# Patient Record
Sex: FEMALE | Race: WHITE | NOT HISPANIC OR LATINO | URBAN - METROPOLITAN AREA
[De-identification: names, ages, dates, MRNs, and addresses within clinical notes are randomized per-mention and may not be internally consistent; named-entity substitution may affect disease eponyms.]

---

## 2019-01-04 ENCOUNTER — OUTPATIENT (OUTPATIENT)
Dept: OUTPATIENT SERVICES | Facility: HOSPITAL | Age: 74
LOS: 1 days | Discharge: HOME | End: 2019-01-04

## 2019-01-04 DIAGNOSIS — I44.7 LEFT BUNDLE-BRANCH BLOCK, UNSPECIFIED: ICD-10-CM

## 2019-01-04 DIAGNOSIS — R93.1 ABNORMAL FINDINGS ON DIAGNOSTIC IMAGING OF HEART AND CORONARY CIRCULATION: ICD-10-CM

## 2019-05-04 PROBLEM — Z00.00 ENCOUNTER FOR PREVENTIVE HEALTH EXAMINATION: Status: ACTIVE | Noted: 2019-05-04

## 2019-06-03 ENCOUNTER — APPOINTMENT (OUTPATIENT)
Dept: CARDIOLOGY | Facility: CLINIC | Age: 74
End: 2019-06-03
Payer: MEDICARE

## 2019-06-03 PROCEDURE — 93000 ELECTROCARDIOGRAM COMPLETE: CPT

## 2019-06-03 PROCEDURE — 99214 OFFICE O/P EST MOD 30 MIN: CPT

## 2019-12-03 ENCOUNTER — APPOINTMENT (OUTPATIENT)
Dept: CARDIOLOGY | Facility: CLINIC | Age: 74
End: 2019-12-03
Payer: MEDICARE

## 2019-12-03 PROCEDURE — 93306 TTE W/DOPPLER COMPLETE: CPT

## 2019-12-10 ENCOUNTER — APPOINTMENT (OUTPATIENT)
Dept: CARDIOLOGY | Facility: CLINIC | Age: 74
End: 2019-12-10
Payer: MEDICARE

## 2019-12-10 PROCEDURE — 93880 EXTRACRANIAL BILAT STUDY: CPT

## 2020-01-13 ENCOUNTER — APPOINTMENT (OUTPATIENT)
Dept: CARDIOLOGY | Facility: CLINIC | Age: 75
End: 2020-01-13
Payer: MEDICARE

## 2020-01-13 PROCEDURE — 93000 ELECTROCARDIOGRAM COMPLETE: CPT

## 2020-01-13 PROCEDURE — 99214 OFFICE O/P EST MOD 30 MIN: CPT

## 2020-02-04 ENCOUNTER — APPOINTMENT (OUTPATIENT)
Dept: VASCULAR SURGERY | Facility: CLINIC | Age: 75
End: 2020-02-04
Payer: MEDICARE

## 2020-02-04 VITALS
SYSTOLIC BLOOD PRESSURE: 136 MMHG | BODY MASS INDEX: 23.99 KG/M2 | HEIGHT: 59 IN | DIASTOLIC BLOOD PRESSURE: 84 MMHG | WEIGHT: 119 LBS

## 2020-02-04 DIAGNOSIS — Z85.3 PERSONAL HISTORY OF MALIGNANT NEOPLASM OF BREAST: ICD-10-CM

## 2020-02-04 DIAGNOSIS — E03.9 HYPOTHYROIDISM, UNSPECIFIED: ICD-10-CM

## 2020-02-04 DIAGNOSIS — Z86.73 PERSONAL HISTORY OF TRANSIENT ISCHEMIC ATTACK (TIA), AND CEREBRAL INFARCTION W/OUT RESIDUAL DEFICITS: ICD-10-CM

## 2020-02-04 DIAGNOSIS — Z82.49 FAMILY HISTORY OF ISCHEMIC HEART DISEASE AND OTHER DISEASES OF THE CIRCULATORY SYSTEM: ICD-10-CM

## 2020-02-04 DIAGNOSIS — F17.200 NICOTINE DEPENDENCE, UNSPECIFIED, UNCOMPLICATED: ICD-10-CM

## 2020-02-04 DIAGNOSIS — Z85.850 PERSONAL HISTORY OF MALIGNANT NEOPLASM OF THYROID: ICD-10-CM

## 2020-02-04 DIAGNOSIS — E78.00 PURE HYPERCHOLESTEROLEMIA, UNSPECIFIED: ICD-10-CM

## 2020-02-04 PROCEDURE — 99203 OFFICE O/P NEW LOW 30 MIN: CPT

## 2020-02-04 PROCEDURE — 93880 EXTRACRANIAL BILAT STUDY: CPT

## 2020-02-04 NOTE — CONSULT LETTER
[Dear  ___] : Dear  [unfilled], [Please see my note below.] : Please see my note below. [Consult Letter:] : I had the pleasure of evaluating your patient, [unfilled]. [FreeTextEntry2] : Dear Dr. Raymond Louie,

## 2020-02-04 NOTE — ASSESSMENT
[FreeTextEntry1] : 75 y/o female with h/o smoking, TIA in 2001 (symptoms of facial droop), sent in by cardiology for left ICA stenosis found on duplex few months ago. She also c/o pain to bilateral legs on ambulation at half block to one block.\par I performed a carotid duplex which was medically necessary to evaluate for high grade carotid stenosis. It showed 50-69% bilateral ICA stenosis with PSV > 200 cm/s bilaterally.\par She has moderate bilateral ICA stenosis on duplex and I would like to get a CTA of the neck for more accurate assessment of the degree of stenosis. I will see her back after the CTA to discuss results.

## 2020-02-04 NOTE — HISTORY OF PRESENT ILLNESS
[FreeTextEntry1] : 73 y/o female with h/o smoking, TIA in 2001 (symptoms of facial droop), sent in by cardiology for left ICA stenosis found on duplex few months ago. She also c/o pain to bilateral legs on ambulation at half block to one block.

## 2020-02-15 ENCOUNTER — FORM ENCOUNTER (OUTPATIENT)
Age: 75
End: 2020-02-15

## 2020-02-16 ENCOUNTER — OUTPATIENT (OUTPATIENT)
Dept: OUTPATIENT SERVICES | Facility: HOSPITAL | Age: 75
LOS: 1 days | Discharge: HOME | End: 2020-02-16
Payer: MEDICARE

## 2020-02-16 DIAGNOSIS — I65.23 OCCLUSION AND STENOSIS OF BILATERAL CAROTID ARTERIES: ICD-10-CM

## 2020-02-16 PROCEDURE — 70498 CT ANGIOGRAPHY NECK: CPT | Mod: 26

## 2020-03-03 ENCOUNTER — APPOINTMENT (OUTPATIENT)
Dept: VASCULAR SURGERY | Facility: CLINIC | Age: 75
End: 2020-03-03
Payer: MEDICARE

## 2020-03-03 PROCEDURE — 99213 OFFICE O/P EST LOW 20 MIN: CPT

## 2020-03-03 NOTE — HISTORY OF PRESENT ILLNESS
[FreeTextEntry1] : 76 y/o female with h/o smoking, TIA in 2001 (symptoms of facial droop), sent in by cardiology for left ICA stenosis found on duplex few months ago. She had a CTA of the neck that showed high-grade bilateral carotid artery stenosis, is right hand dominant.

## 2020-03-03 NOTE — CONSULT LETTER
[Dear  ___] : Dear  [unfilled], [Please see my note below.] : Please see my note below. [Courtesy Letter:] : I had the pleasure of seeing your patient, [unfilled], in my office today. [FreeTextEntry2] : Dear Dr. Raymond Louie,

## 2020-03-03 NOTE — ASSESSMENT
[FreeTextEntry1] : 76 y/o female with h/o smoking, TIA in 2001 (symptoms of facial droop), sent in by cardiology for left ICA stenosis found on duplex few months ago. She had a CTA of the neck that showed high-grade bilateral carotid artery stenosis, is right hand dominant.\par I have offered her left carotid endarterectomy for stroke prevention. I have discussed the surgical procedure with her including the risks and benefits and she wants to proceed. I have instructed her to start taking Aspirin 81 mg daily and continue on it throughout the period of surgery.\par She will require medical and cardiac clearance prior to surgery. She is scheduled for left CEA on 3/19/2020.

## 2020-03-10 ENCOUNTER — FORM ENCOUNTER (OUTPATIENT)
Age: 75
End: 2020-03-10

## 2020-03-11 ENCOUNTER — OUTPATIENT (OUTPATIENT)
Dept: OUTPATIENT SERVICES | Facility: HOSPITAL | Age: 75
LOS: 1 days | Discharge: HOME | End: 2020-03-11
Payer: MEDICARE

## 2020-03-11 VITALS
RESPIRATION RATE: 15 BRPM | DIASTOLIC BLOOD PRESSURE: 74 MMHG | OXYGEN SATURATION: 98 % | WEIGHT: 134.92 LBS | HEIGHT: 58 IN | HEART RATE: 76 BPM | TEMPERATURE: 98 F | SYSTOLIC BLOOD PRESSURE: 157 MMHG

## 2020-03-11 DIAGNOSIS — Z01.818 ENCOUNTER FOR OTHER PREPROCEDURAL EXAMINATION: ICD-10-CM

## 2020-03-11 DIAGNOSIS — I65.23 OCCLUSION AND STENOSIS OF BILATERAL CAROTID ARTERIES: ICD-10-CM

## 2020-03-11 DIAGNOSIS — Z90.710 ACQUIRED ABSENCE OF BOTH CERVIX AND UTERUS: Chronic | ICD-10-CM

## 2020-03-11 DIAGNOSIS — E89.0 POSTPROCEDURAL HYPOTHYROIDISM: Chronic | ICD-10-CM

## 2020-03-11 LAB
ALBUMIN SERPL ELPH-MCNC: 4.3 G/DL — SIGNIFICANT CHANGE UP (ref 3.5–5.2)
ALP SERPL-CCNC: 70 U/L — SIGNIFICANT CHANGE UP (ref 30–115)
ALT FLD-CCNC: 9 U/L — SIGNIFICANT CHANGE UP (ref 0–41)
ANION GAP SERPL CALC-SCNC: 10 MMOL/L — SIGNIFICANT CHANGE UP (ref 7–14)
APTT BLD: 37.6 SEC — SIGNIFICANT CHANGE UP (ref 27–39.2)
AST SERPL-CCNC: 15 U/L — SIGNIFICANT CHANGE UP (ref 0–41)
BASOPHILS # BLD AUTO: 0.09 K/UL — SIGNIFICANT CHANGE UP (ref 0–0.2)
BASOPHILS NFR BLD AUTO: 1.1 % — HIGH (ref 0–1)
BILIRUB SERPL-MCNC: 0.4 MG/DL — SIGNIFICANT CHANGE UP (ref 0.2–1.2)
BUN SERPL-MCNC: 13 MG/DL — SIGNIFICANT CHANGE UP (ref 10–20)
CALCIUM SERPL-MCNC: 9.4 MG/DL — SIGNIFICANT CHANGE UP (ref 8.5–10.1)
CHLORIDE SERPL-SCNC: 106 MMOL/L — SIGNIFICANT CHANGE UP (ref 98–110)
CO2 SERPL-SCNC: 26 MMOL/L — SIGNIFICANT CHANGE UP (ref 17–32)
CREAT SERPL-MCNC: 0.7 MG/DL — SIGNIFICANT CHANGE UP (ref 0.7–1.5)
EOSINOPHIL # BLD AUTO: 0.21 K/UL — SIGNIFICANT CHANGE UP (ref 0–0.7)
EOSINOPHIL NFR BLD AUTO: 2.5 % — SIGNIFICANT CHANGE UP (ref 0–8)
GLUCOSE SERPL-MCNC: 89 MG/DL — SIGNIFICANT CHANGE UP (ref 70–99)
HCT VFR BLD CALC: 43.8 % — SIGNIFICANT CHANGE UP (ref 37–47)
HGB BLD-MCNC: 14.1 G/DL — SIGNIFICANT CHANGE UP (ref 12–16)
IMM GRANULOCYTES NFR BLD AUTO: 0.4 % — HIGH (ref 0.1–0.3)
INR BLD: 1.08 RATIO — SIGNIFICANT CHANGE UP (ref 0.65–1.3)
LYMPHOCYTES # BLD AUTO: 2.07 K/UL — SIGNIFICANT CHANGE UP (ref 1.2–3.4)
LYMPHOCYTES # BLD AUTO: 24.2 % — SIGNIFICANT CHANGE UP (ref 20.5–51.1)
MCHC RBC-ENTMCNC: 29.3 PG — SIGNIFICANT CHANGE UP (ref 27–31)
MCHC RBC-ENTMCNC: 32.2 G/DL — SIGNIFICANT CHANGE UP (ref 32–37)
MCV RBC AUTO: 91.1 FL — SIGNIFICANT CHANGE UP (ref 81–99)
MONOCYTES # BLD AUTO: 0.54 K/UL — SIGNIFICANT CHANGE UP (ref 0.1–0.6)
MONOCYTES NFR BLD AUTO: 6.3 % — SIGNIFICANT CHANGE UP (ref 1.7–9.3)
NEUTROPHILS # BLD AUTO: 5.61 K/UL — SIGNIFICANT CHANGE UP (ref 1.4–6.5)
NEUTROPHILS NFR BLD AUTO: 65.5 % — SIGNIFICANT CHANGE UP (ref 42.2–75.2)
NRBC # BLD: 0 /100 WBCS — SIGNIFICANT CHANGE UP (ref 0–0)
PLATELET # BLD AUTO: 263 K/UL — SIGNIFICANT CHANGE UP (ref 130–400)
POTASSIUM SERPL-MCNC: 5.1 MMOL/L — HIGH (ref 3.5–5)
POTASSIUM SERPL-SCNC: 5.1 MMOL/L — HIGH (ref 3.5–5)
PROT SERPL-MCNC: 6.9 G/DL — SIGNIFICANT CHANGE UP (ref 6–8)
PROTHROM AB SERPL-ACNC: 12.4 SEC — SIGNIFICANT CHANGE UP (ref 9.95–12.87)
RBC # BLD: 4.81 M/UL — SIGNIFICANT CHANGE UP (ref 4.2–5.4)
RBC # FLD: 13.2 % — SIGNIFICANT CHANGE UP (ref 11.5–14.5)
SODIUM SERPL-SCNC: 142 MMOL/L — SIGNIFICANT CHANGE UP (ref 135–146)
WBC # BLD: 8.55 K/UL — SIGNIFICANT CHANGE UP (ref 4.8–10.8)
WBC # FLD AUTO: 8.55 K/UL — SIGNIFICANT CHANGE UP (ref 4.8–10.8)

## 2020-03-11 PROCEDURE — 93010 ELECTROCARDIOGRAM REPORT: CPT

## 2020-03-11 PROCEDURE — 71046 X-RAY EXAM CHEST 2 VIEWS: CPT | Mod: 26

## 2020-03-11 NOTE — H&P PST ADULT - RS GEN PE MLT RESP DETAILS PC
airway patent/no chest wall tenderness/breath sounds equal/good air movement/respirations non-labored/clear to auscultation bilaterally/normal

## 2020-03-11 NOTE — H&P PST ADULT - NSICDXPASTMEDICALHX_GEN_ALL_CORE_FT
PAST MEDICAL HISTORY:  Breast cancer LEFT RADICALMASTECTECTOMY    HTN (hypertension)     Hypercholesterolemia     Hypothyroidism     Stroke TIA

## 2020-03-11 NOTE — H&P PST ADULT - REASON FOR ADMISSION
PT PRESENTS TO PAST WITH NO SOB, CP, PALPITATIONS, DYSURIA, UTI OR URI AT PRESENT.   PT ABLE TO WALK UP 1/2   FLIGHTS OF STEPS WITH NO SOB- VERY SLOWLY.   AS PER THE PT, THIS IS HIS/HER COMPLETE MEDICAL AND SURGICAL HX, INCLUDING MEDICATIONS PRESCRIBED AND OVER THE COUNTER  PT PRESENTS TO PAST WITH H/O-  SOB WITH EXERTION.  PT SCHEDULED FOR LEFT CAROTID ENDARTERECTOMY WITH PATCH.

## 2020-03-31 PROBLEM — E78.00 PURE HYPERCHOLESTEROLEMIA, UNSPECIFIED: Chronic | Status: ACTIVE | Noted: 2020-03-11

## 2020-03-31 PROBLEM — C50.919 MALIGNANT NEOPLASM OF UNSPECIFIED SITE OF UNSPECIFIED FEMALE BREAST: Chronic | Status: ACTIVE | Noted: 2020-03-11

## 2020-03-31 PROBLEM — E03.9 HYPOTHYROIDISM, UNSPECIFIED: Chronic | Status: ACTIVE | Noted: 2020-03-11

## 2020-03-31 PROBLEM — I10 ESSENTIAL (PRIMARY) HYPERTENSION: Chronic | Status: ACTIVE | Noted: 2020-03-11

## 2020-05-27 ENCOUNTER — OUTPATIENT (OUTPATIENT)
Dept: OUTPATIENT SERVICES | Facility: HOSPITAL | Age: 75
LOS: 1 days | Discharge: HOME | End: 2020-05-27

## 2020-05-27 VITALS
HEIGHT: 59 IN | DIASTOLIC BLOOD PRESSURE: 63 MMHG | RESPIRATION RATE: 16 BRPM | OXYGEN SATURATION: 96 % | HEART RATE: 74 BPM | SYSTOLIC BLOOD PRESSURE: 148 MMHG | WEIGHT: 128.97 LBS | TEMPERATURE: 98 F

## 2020-05-27 DIAGNOSIS — Z90.710 ACQUIRED ABSENCE OF BOTH CERVIX AND UTERUS: Chronic | ICD-10-CM

## 2020-05-27 DIAGNOSIS — Z01.818 ENCOUNTER FOR OTHER PREPROCEDURAL EXAMINATION: ICD-10-CM

## 2020-05-27 DIAGNOSIS — I65.23 OCCLUSION AND STENOSIS OF BILATERAL CAROTID ARTERIES: ICD-10-CM

## 2020-05-27 DIAGNOSIS — E89.0 POSTPROCEDURAL HYPOTHYROIDISM: Chronic | ICD-10-CM

## 2020-05-27 LAB
ALBUMIN SERPL ELPH-MCNC: 4.2 G/DL — SIGNIFICANT CHANGE UP (ref 3.5–5.2)
ALP SERPL-CCNC: 68 U/L — SIGNIFICANT CHANGE UP (ref 30–115)
ALT FLD-CCNC: 12 U/L — SIGNIFICANT CHANGE UP (ref 0–41)
ANION GAP SERPL CALC-SCNC: 14 MMOL/L — SIGNIFICANT CHANGE UP (ref 7–14)
APTT BLD: 33 SEC — SIGNIFICANT CHANGE UP (ref 27–39.2)
AST SERPL-CCNC: 18 U/L — SIGNIFICANT CHANGE UP (ref 0–41)
BASOPHILS # BLD AUTO: 0.08 K/UL — SIGNIFICANT CHANGE UP (ref 0–0.2)
BASOPHILS NFR BLD AUTO: 0.9 % — SIGNIFICANT CHANGE UP (ref 0–1)
BILIRUB SERPL-MCNC: <0.2 MG/DL — SIGNIFICANT CHANGE UP (ref 0.2–1.2)
BLD GP AB SCN SERPL QL: SIGNIFICANT CHANGE UP
BUN SERPL-MCNC: 16 MG/DL — SIGNIFICANT CHANGE UP (ref 10–20)
CALCIUM SERPL-MCNC: 8.8 MG/DL — SIGNIFICANT CHANGE UP (ref 8.5–10.1)
CHLORIDE SERPL-SCNC: 108 MMOL/L — SIGNIFICANT CHANGE UP (ref 98–110)
CO2 SERPL-SCNC: 23 MMOL/L — SIGNIFICANT CHANGE UP (ref 17–32)
CREAT SERPL-MCNC: 0.8 MG/DL — SIGNIFICANT CHANGE UP (ref 0.7–1.5)
EOSINOPHIL # BLD AUTO: 0.18 K/UL — SIGNIFICANT CHANGE UP (ref 0–0.7)
EOSINOPHIL NFR BLD AUTO: 1.9 % — SIGNIFICANT CHANGE UP (ref 0–8)
GLUCOSE SERPL-MCNC: 102 MG/DL — HIGH (ref 70–99)
HCT VFR BLD CALC: 40.4 % — SIGNIFICANT CHANGE UP (ref 37–47)
HGB BLD-MCNC: 13.1 G/DL — SIGNIFICANT CHANGE UP (ref 12–16)
IMM GRANULOCYTES NFR BLD AUTO: 0.3 % — SIGNIFICANT CHANGE UP (ref 0.1–0.3)
INR BLD: 0.97 RATIO — SIGNIFICANT CHANGE UP (ref 0.65–1.3)
LYMPHOCYTES # BLD AUTO: 2.38 K/UL — SIGNIFICANT CHANGE UP (ref 1.2–3.4)
LYMPHOCYTES # BLD AUTO: 25.3 % — SIGNIFICANT CHANGE UP (ref 20.5–51.1)
MCHC RBC-ENTMCNC: 29.6 PG — SIGNIFICANT CHANGE UP (ref 27–31)
MCHC RBC-ENTMCNC: 32.4 G/DL — SIGNIFICANT CHANGE UP (ref 32–37)
MCV RBC AUTO: 91.2 FL — SIGNIFICANT CHANGE UP (ref 81–99)
MONOCYTES # BLD AUTO: 0.63 K/UL — HIGH (ref 0.1–0.6)
MONOCYTES NFR BLD AUTO: 6.7 % — SIGNIFICANT CHANGE UP (ref 1.7–9.3)
NEUTROPHILS # BLD AUTO: 6.09 K/UL — SIGNIFICANT CHANGE UP (ref 1.4–6.5)
NEUTROPHILS NFR BLD AUTO: 64.9 % — SIGNIFICANT CHANGE UP (ref 42.2–75.2)
NRBC # BLD: 0 /100 WBCS — SIGNIFICANT CHANGE UP (ref 0–0)
PLATELET # BLD AUTO: 239 K/UL — SIGNIFICANT CHANGE UP (ref 130–400)
POTASSIUM SERPL-MCNC: 4.5 MMOL/L — SIGNIFICANT CHANGE UP (ref 3.5–5)
POTASSIUM SERPL-SCNC: 4.5 MMOL/L — SIGNIFICANT CHANGE UP (ref 3.5–5)
PROT SERPL-MCNC: 6.6 G/DL — SIGNIFICANT CHANGE UP (ref 6–8)
PROTHROM AB SERPL-ACNC: 11.2 SEC — SIGNIFICANT CHANGE UP (ref 9.95–12.87)
RBC # BLD: 4.43 M/UL — SIGNIFICANT CHANGE UP (ref 4.2–5.4)
RBC # FLD: 13.4 % — SIGNIFICANT CHANGE UP (ref 11.5–14.5)
SODIUM SERPL-SCNC: 145 MMOL/L — SIGNIFICANT CHANGE UP (ref 135–146)
WBC # BLD: 9.39 K/UL — SIGNIFICANT CHANGE UP (ref 4.8–10.8)
WBC # FLD AUTO: 9.39 K/UL — SIGNIFICANT CHANGE UP (ref 4.8–10.8)

## 2020-05-27 RX ORDER — SIMVASTATIN 20 MG/1
1 TABLET, FILM COATED ORAL
Qty: 0 | Refills: 0 | DISCHARGE

## 2020-05-27 RX ORDER — ROSUVASTATIN CALCIUM 5 MG/1
1 TABLET ORAL
Qty: 0 | Refills: 0 | DISCHARGE

## 2020-05-27 NOTE — H&P PST ADULT - HISTORY OF PRESENT ILLNESS
PT PRESENTS TO PAST WITH NO SOB, CP, PALPITATIONS, DYSURIA, UTI OR URI AT PRESENT.   PT ABLE TO WALK UP 1/2   FLIGHTS OF STEPS WITH NO SOB- VERY SLOWLY.   AS PER THE PT, THIS IS HIS/HER COMPLETE MEDICAL AND SURGICAL HX, INCLUDING MEDICATIONS PRESCRIBED AND OVER THE COUNTER  PT PRESENTS TO PAST WITH H/O-  SOB WITH EXERTION.  DENIES EXPOSURE TO COVID POSITIVE PERSON.  DENIES TRAVELING OUTSIDE [place]/[facility] IN THE PAST 30 DAYS          .

## 2020-05-27 NOTE — H&P PST ADULT - REASON FOR ADMISSION
PT SCHEDULED FOR LEFT CAROTID ENDARTERECTOMY WITH PATCH ON 6/3/2020 AT [facility]

## 2020-05-27 NOTE — H&P PST ADULT - RS GEN PE MLT RESP DETAILS PC
clear to auscultation bilaterally/normal/no chest wall tenderness/airway patent/respirations non-labored/breath sounds equal/good air movement

## 2020-06-03 ENCOUNTER — INPATIENT (INPATIENT)
Facility: HOSPITAL | Age: 75
LOS: 0 days | Discharge: HOME | End: 2020-06-04
Attending: SURGERY | Admitting: SURGERY
Payer: MEDICARE

## 2020-06-03 ENCOUNTER — RESULT REVIEW (OUTPATIENT)
Age: 75
End: 2020-06-03

## 2020-06-03 ENCOUNTER — APPOINTMENT (OUTPATIENT)
Dept: VASCULAR SURGERY | Facility: HOSPITAL | Age: 75
End: 2020-06-03

## 2020-06-03 VITALS
DIASTOLIC BLOOD PRESSURE: 66 MMHG | RESPIRATION RATE: 18 BRPM | HEIGHT: 59 IN | OXYGEN SATURATION: 96 % | SYSTOLIC BLOOD PRESSURE: 170 MMHG | TEMPERATURE: 98 F | WEIGHT: 117.07 LBS | HEART RATE: 69 BPM

## 2020-06-03 DIAGNOSIS — E89.0 POSTPROCEDURAL HYPOTHYROIDISM: Chronic | ICD-10-CM

## 2020-06-03 DIAGNOSIS — Z90.710 ACQUIRED ABSENCE OF BOTH CERVIX AND UTERUS: Chronic | ICD-10-CM

## 2020-06-03 LAB
ANION GAP SERPL CALC-SCNC: 11 MMOL/L — SIGNIFICANT CHANGE UP (ref 7–14)
ANION GAP SERPL CALC-SCNC: 12 MMOL/L — SIGNIFICANT CHANGE UP (ref 7–14)
APTT BLD: 136.6 SEC — CRITICAL HIGH (ref 27–39.2)
APTT BLD: 30.3 SEC — SIGNIFICANT CHANGE UP (ref 27–39.2)
BASOPHILS # BLD AUTO: 0.07 K/UL — SIGNIFICANT CHANGE UP (ref 0–0.2)
BASOPHILS NFR BLD AUTO: 0.7 % — SIGNIFICANT CHANGE UP (ref 0–1)
BUN SERPL-MCNC: 13 MG/DL — SIGNIFICANT CHANGE UP (ref 10–20)
BUN SERPL-MCNC: 14 MG/DL — SIGNIFICANT CHANGE UP (ref 10–20)
CALCIUM SERPL-MCNC: 8.2 MG/DL — LOW (ref 8.5–10.1)
CALCIUM SERPL-MCNC: 8.5 MG/DL — SIGNIFICANT CHANGE UP (ref 8.5–10.1)
CHLORIDE SERPL-SCNC: 107 MMOL/L — SIGNIFICANT CHANGE UP (ref 98–110)
CHLORIDE SERPL-SCNC: 108 MMOL/L — SIGNIFICANT CHANGE UP (ref 98–110)
CK MB CFR SERPL CALC: 1.7 NG/ML — SIGNIFICANT CHANGE UP (ref 0.6–6.3)
CK MB CFR SERPL CALC: 2.6 NG/ML — SIGNIFICANT CHANGE UP (ref 0.6–6.3)
CK SERPL-CCNC: 37 U/L — SIGNIFICANT CHANGE UP (ref 0–225)
CK SERPL-CCNC: 75 U/L — SIGNIFICANT CHANGE UP (ref 0–225)
CO2 SERPL-SCNC: 20 MMOL/L — SIGNIFICANT CHANGE UP (ref 17–32)
CO2 SERPL-SCNC: 23 MMOL/L — SIGNIFICANT CHANGE UP (ref 17–32)
CREAT SERPL-MCNC: 0.7 MG/DL — SIGNIFICANT CHANGE UP (ref 0.7–1.5)
CREAT SERPL-MCNC: 0.8 MG/DL — SIGNIFICANT CHANGE UP (ref 0.7–1.5)
EOSINOPHIL # BLD AUTO: 0.09 K/UL — SIGNIFICANT CHANGE UP (ref 0–0.7)
EOSINOPHIL NFR BLD AUTO: 0.8 % — SIGNIFICANT CHANGE UP (ref 0–8)
GLUCOSE SERPL-MCNC: 135 MG/DL — HIGH (ref 70–99)
GLUCOSE SERPL-MCNC: 136 MG/DL — HIGH (ref 70–99)
HCT VFR BLD CALC: 35.3 % — LOW (ref 37–47)
HGB BLD-MCNC: 11.7 G/DL — LOW (ref 12–16)
IMM GRANULOCYTES NFR BLD AUTO: 0.6 % — HIGH (ref 0.1–0.3)
INR BLD: 1.07 RATIO — SIGNIFICANT CHANGE UP (ref 0.65–1.3)
INR BLD: 1.16 RATIO — SIGNIFICANT CHANGE UP (ref 0.65–1.3)
LYMPHOCYTES # BLD AUTO: 1.27 K/UL — SIGNIFICANT CHANGE UP (ref 1.2–3.4)
LYMPHOCYTES # BLD AUTO: 11.9 % — LOW (ref 20.5–51.1)
MAGNESIUM SERPL-MCNC: 1.8 MG/DL — SIGNIFICANT CHANGE UP (ref 1.8–2.4)
MAGNESIUM SERPL-MCNC: 1.9 MG/DL — SIGNIFICANT CHANGE UP (ref 1.8–2.4)
MCHC RBC-ENTMCNC: 29.8 PG — SIGNIFICANT CHANGE UP (ref 27–31)
MCHC RBC-ENTMCNC: 33.1 G/DL — SIGNIFICANT CHANGE UP (ref 32–37)
MCV RBC AUTO: 90.1 FL — SIGNIFICANT CHANGE UP (ref 81–99)
MONOCYTES # BLD AUTO: 0.24 K/UL — SIGNIFICANT CHANGE UP (ref 0.1–0.6)
MONOCYTES NFR BLD AUTO: 2.2 % — SIGNIFICANT CHANGE UP (ref 1.7–9.3)
NEUTROPHILS # BLD AUTO: 8.97 K/UL — HIGH (ref 1.4–6.5)
NEUTROPHILS NFR BLD AUTO: 83.8 % — HIGH (ref 42.2–75.2)
NRBC # BLD: 0 /100 WBCS — SIGNIFICANT CHANGE UP (ref 0–0)
PHOSPHATE SERPL-MCNC: 2.5 MG/DL — SIGNIFICANT CHANGE UP (ref 2.1–4.9)
PHOSPHATE SERPL-MCNC: 4.4 MG/DL — SIGNIFICANT CHANGE UP (ref 2.1–4.9)
PLATELET # BLD AUTO: 196 K/UL — SIGNIFICANT CHANGE UP (ref 130–400)
POTASSIUM SERPL-MCNC: 4.2 MMOL/L — SIGNIFICANT CHANGE UP (ref 3.5–5)
POTASSIUM SERPL-MCNC: 4.2 MMOL/L — SIGNIFICANT CHANGE UP (ref 3.5–5)
POTASSIUM SERPL-SCNC: 4.2 MMOL/L — SIGNIFICANT CHANGE UP (ref 3.5–5)
POTASSIUM SERPL-SCNC: 4.2 MMOL/L — SIGNIFICANT CHANGE UP (ref 3.5–5)
PROTHROM AB SERPL-ACNC: 12.3 SEC — SIGNIFICANT CHANGE UP (ref 9.95–12.87)
PROTHROM AB SERPL-ACNC: 13.3 SEC — HIGH (ref 9.95–12.87)
RBC # BLD: 3.92 M/UL — LOW (ref 4.2–5.4)
RBC # FLD: 13.7 % — SIGNIFICANT CHANGE UP (ref 11.5–14.5)
SODIUM SERPL-SCNC: 139 MMOL/L — SIGNIFICANT CHANGE UP (ref 135–146)
SODIUM SERPL-SCNC: 142 MMOL/L — SIGNIFICANT CHANGE UP (ref 135–146)
TROPONIN T SERPL-MCNC: <0.01 NG/ML — SIGNIFICANT CHANGE UP
TROPONIN T SERPL-MCNC: <0.01 NG/ML — SIGNIFICANT CHANGE UP
WBC # BLD: 10.7 K/UL — SIGNIFICANT CHANGE UP (ref 4.8–10.8)
WBC # FLD AUTO: 10.7 K/UL — SIGNIFICANT CHANGE UP (ref 4.8–10.8)

## 2020-06-03 PROCEDURE — 35301 RECHANNELING OF ARTERY: CPT | Mod: LT

## 2020-06-03 PROCEDURE — 71045 X-RAY EXAM CHEST 1 VIEW: CPT | Mod: 26

## 2020-06-03 PROCEDURE — 88304 TISSUE EXAM BY PATHOLOGIST: CPT | Mod: 26

## 2020-06-03 PROCEDURE — 93010 ELECTROCARDIOGRAM REPORT: CPT

## 2020-06-03 PROCEDURE — 88311 DECALCIFY TISSUE: CPT | Mod: 26

## 2020-06-03 PROCEDURE — 99291 CRITICAL CARE FIRST HOUR: CPT

## 2020-06-03 RX ORDER — HEPARIN SODIUM 5000 [USP'U]/ML
5000 INJECTION INTRAVENOUS; SUBCUTANEOUS EVERY 8 HOURS
Refills: 0 | Status: DISCONTINUED | OUTPATIENT
Start: 2020-06-04 | End: 2020-06-04

## 2020-06-03 RX ORDER — ATORVASTATIN CALCIUM 80 MG/1
80 TABLET, FILM COATED ORAL AT BEDTIME
Refills: 0 | Status: DISCONTINUED | OUTPATIENT
Start: 2020-06-04 | End: 2020-06-04

## 2020-06-03 RX ORDER — LISINOPRIL 2.5 MG/1
20 TABLET ORAL DAILY
Refills: 0 | Status: DISCONTINUED | OUTPATIENT
Start: 2020-06-03 | End: 2020-06-04

## 2020-06-03 RX ORDER — ACETAMINOPHEN 500 MG
650 TABLET ORAL EVERY 6 HOURS
Refills: 0 | Status: DISCONTINUED | OUTPATIENT
Start: 2020-06-03 | End: 2020-06-04

## 2020-06-03 RX ORDER — SODIUM CHLORIDE 9 MG/ML
1000 INJECTION, SOLUTION INTRAVENOUS
Refills: 0 | Status: DISCONTINUED | OUTPATIENT
Start: 2020-06-03 | End: 2020-06-04

## 2020-06-03 RX ORDER — SODIUM CHLORIDE 9 MG/ML
1000 INJECTION, SOLUTION INTRAVENOUS
Refills: 0 | Status: DISCONTINUED | OUTPATIENT
Start: 2020-06-03 | End: 2020-06-03

## 2020-06-03 RX ORDER — CHLORHEXIDINE GLUCONATE 213 G/1000ML
1 SOLUTION TOPICAL
Refills: 0 | Status: DISCONTINUED | OUTPATIENT
Start: 2020-06-03 | End: 2020-06-04

## 2020-06-03 RX ORDER — MORPHINE SULFATE 50 MG/1
2 CAPSULE, EXTENDED RELEASE ORAL
Refills: 0 | Status: DISCONTINUED | OUTPATIENT
Start: 2020-06-03 | End: 2020-06-03

## 2020-06-03 RX ORDER — PANTOPRAZOLE SODIUM 20 MG/1
40 TABLET, DELAYED RELEASE ORAL
Refills: 0 | Status: DISCONTINUED | OUTPATIENT
Start: 2020-06-03 | End: 2020-06-04

## 2020-06-03 RX ORDER — FOLIC ACID 0.8 MG
1 TABLET ORAL DAILY
Refills: 0 | Status: DISCONTINUED | OUTPATIENT
Start: 2020-06-03 | End: 2020-06-04

## 2020-06-03 RX ORDER — MAGNESIUM SULFATE 500 MG/ML
1 VIAL (ML) INJECTION ONCE
Refills: 0 | Status: COMPLETED | OUTPATIENT
Start: 2020-06-03 | End: 2020-06-04

## 2020-06-03 RX ORDER — ACETAMINOPHEN 500 MG
650 TABLET ORAL ONCE
Refills: 0 | Status: COMPLETED | OUTPATIENT
Start: 2020-06-03 | End: 2020-06-03

## 2020-06-03 RX ORDER — LEVOTHYROXINE SODIUM 125 MCG
75 TABLET ORAL DAILY
Refills: 0 | Status: DISCONTINUED | OUTPATIENT
Start: 2020-06-03 | End: 2020-06-04

## 2020-06-03 RX ORDER — ASPIRIN/CALCIUM CARB/MAGNESIUM 324 MG
81 TABLET ORAL DAILY
Refills: 0 | Status: DISCONTINUED | OUTPATIENT
Start: 2020-06-04 | End: 2020-06-04

## 2020-06-03 RX ORDER — ATORVASTATIN CALCIUM 80 MG/1
80 TABLET, FILM COATED ORAL AT BEDTIME
Refills: 0 | Status: DISCONTINUED | OUTPATIENT
Start: 2020-06-03 | End: 2020-06-03

## 2020-06-03 RX ORDER — OXYCODONE HYDROCHLORIDE 5 MG/1
5 TABLET ORAL EVERY 4 HOURS
Refills: 0 | Status: DISCONTINUED | OUTPATIENT
Start: 2020-06-03 | End: 2020-06-04

## 2020-06-03 RX ORDER — MORPHINE SULFATE 50 MG/1
4 CAPSULE, EXTENDED RELEASE ORAL
Refills: 0 | Status: DISCONTINUED | OUTPATIENT
Start: 2020-06-03 | End: 2020-06-03

## 2020-06-03 RX ORDER — ONDANSETRON 8 MG/1
4 TABLET, FILM COATED ORAL ONCE
Refills: 0 | Status: DISCONTINUED | OUTPATIENT
Start: 2020-06-03 | End: 2020-06-03

## 2020-06-03 RX ADMIN — MORPHINE SULFATE 2 MILLIGRAM(S): 50 CAPSULE, EXTENDED RELEASE ORAL at 14:50

## 2020-06-03 RX ADMIN — Medication 1.25 MILLIGRAM(S): at 20:35

## 2020-06-03 RX ADMIN — MORPHINE SULFATE 2 MILLIGRAM(S): 50 CAPSULE, EXTENDED RELEASE ORAL at 15:02

## 2020-06-03 RX ADMIN — Medication 650 MILLIGRAM(S): at 23:57

## 2020-06-03 RX ADMIN — Medication 650 MILLIGRAM(S): at 20:34

## 2020-06-03 RX ADMIN — LISINOPRIL 20 MILLIGRAM(S): 2.5 TABLET ORAL at 20:16

## 2020-06-03 RX ADMIN — MORPHINE SULFATE 4 MILLIGRAM(S): 50 CAPSULE, EXTENDED RELEASE ORAL at 14:35

## 2020-06-03 RX ADMIN — Medication 650 MILLIGRAM(S): at 18:54

## 2020-06-03 NOTE — CONSULT NOTE ADULT - ASSESSMENT
Assessment & Plan    75y Female  s/p     NEURO:    Acute pain-controlled with     RESP:     Oxygen insufficiency-wean off NC to RA as tolerate    Activity-      CARDS:       Imaging:     Labs:     GI/NUTR:     Diet, Clear Liquid      GI Prophylaxis-    Bowel regimen-      pantoprazole    Tablet 40 milliGRAM(s) Oral before breakfast      /RENAL:       Strict I/O-    BUN/Cr-           HEME/ONC:       DVT prophylaxis-    Acute anemia-H/H     Pulse Exam-      ID:          ENDO:      Glucose     HA1C     LINES/DRAINS:  Barby DAVIS Foley     DISPO:  SICU, d/w Dr. Man Assessment & Plan    75F s/p left carotid endarterectomy.     NEURO:    Acute pain-controlled with     RESP:     Oxygen insufficiency-wean off NC to RA as tolerate    Activity-      CARDS:       Imaging:     Labs:     GI/NUTR:     Diet, Clear Liquid      GI Prophylaxis-    Bowel regimen-    pantoprazole    Tablet 40 milliGRAM(s) Oral before breakfast    /RENAL:       Strict I/O-    BUN/Cr-     HEME/ONC:       DVT prophylaxis-    Acute anemia-H/H     Pulse Exam-    ID:          ENDO:      Glucose     HA1C     LINES/DRAINS:  Barby DAVIS Foley    DISPO:  SICU, d/w Dr. Man Assessment & Plan  75F s/p left carotid endarterectomy.     NEURO:  - Acute pain-controlled with APAP 650mg Q6H, morphine 2 PRN    RESP:   - Oxygen insufficiency-wean off NC to RA as tolerated        CARDS:   - PMH of HTN      GI/NUTR:     Diet, Clear Liquid      GI Prophylaxis-    Bowel regimen-    pantoprazole    Tablet 40 milliGRAM(s) Oral before breakfast    /RENAL:       Strict I/O-    BUN/Cr-     HEME/ONC:       DVT prophylaxis-    Acute anemia-H/H     Pulse Exam-    ID:  - no active issues  - received 2g Ancef pre-operatively    ENDO:  - PMH of     Glucose     HA1C     LINES/DRAINS:  RYAN, Ambreen Dior  ACTIVITY: bedrest for 24 hours  DISPO:  SICU, d/w Dr. Man Assessment & Plan  75F w/ PMH of HLD, breast cancer s/p left radial mastectomy, hypothyroidism, HTN, thyroid cancer s/p total thyroidectomy, and s/p partial hysterectomy who presents POD 0 s/p left carotid endarterectomy.     NEURO:  - Pain: APAP 650mg Q6H, morphine 2 PRN  - Q1H neuro checks  - no neuro deficits    RESP:   - wean off NC to RA as tolerated  - AM CXR  - active smoker      CARDS:   - PMH of HTN: on home lisinopril  - SBP goal 130-150  - if persistently hypertensive, consider PRN pushes of vasotec  - PMH of HLD: home med rosuvastatin, starting atorvastatin tomorrow 6/4  - starting aspirin 81mg tomorrow 6/4  - trend CE: 0.01 >     GI/NUTR:  - diet: CLD, regular in AM  - GI PPX: PTX    /RENAL:   - FU TOV  - BUN/Cr 14/0.8    HEME/ONC:   - DVT PPX: HSQ to start 6/4  - H/H: 11.7 / 35.3  - received 6500 heparin intraoperatively, .6    ID:  - no active issues  - received 2g Ancef pre-operatively    ENDO:  - PMH of thyroid cancer s/p thyroidectomy  - on home levothyroxine    LINES/DRAINS:  PIV, R radial Barby, L arm precautions  ACTIVITY: bedrest for 24 hours  DISPO: SICU, d/w Dr. Man

## 2020-06-03 NOTE — CONSULT NOTE ADULT - SUBJECTIVE AND OBJECTIVE BOX
SICU Consultation Note  ======================================================================================================  JAZMIN ESPARZA  [id number]      75y Female        Consult:      Pertinent Imaging:  Echo-  CT-      Procedure:  OR Stats  OR Time:               EBL:          IV Fluids:       Blood Products:                UOP:      Findings-    PMH  PAST MEDICAL & SURGICAL HISTORY:  Hypercholesterolemia  Stroke: TIA  Breast cancer: LEFT RADICALMASTECTECTOMY  Hypothyroidism  HTN (hypertension)  H/O total thyroidectomy  H/O: hysterectomy: PARTIAL      Home Meds:  Home Medications:  aspirin 81 mg oral tablet: 1 tab(s) orally once a day (03 Jun 2020 09:17)  Crestor 20 mg oral tablet: 1 tab(s) orally once a day (03 Jun 2020 09:17)  folic acid 1 mg oral tablet: 1 tab(s) orally once a day (03 Jun 2020 09:17)  lisinopril 20 mg oral tablet: 1 tab(s) orally once a day (03 Jun 2020 09:17)  Synthroid 75 mcg (0.075 mg) oral tablet: 1 tab(s) orally once a day (03 Jun 2020 09:17)  Zoloft:  (03 Jun 2020 09:17)         Allergies  Allergies    No Known Allergies    Intolerances         Current Medications:  acetaminophen   Tablet .. 650 milliGRAM(s) Oral every 6 hours  atorvastatin 80 milliGRAM(s) Oral at bedtime  chlorhexidine 4% Liquid 1 Application(s) Topical <User Schedule>  folic acid 1 milliGRAM(s) Oral daily  lactated ringers. 1000 milliLiter(s) (75 mL/Hr) IV Continuous <Continuous>  lactated ringers. 1000 milliLiter(s) (70 mL/Hr) IV Continuous <Continuous>  levothyroxine 75 MICROGram(s) Oral daily  lisinopril 20 milliGRAM(s) Oral daily  morphine  - Injectable 2 milliGRAM(s) IV Push every 10 minutes PRN Moderate Pain (4 - 6)  ondansetron Injectable 4 milliGRAM(s) IV Push once PRN Nausea and/or Vomiting  pantoprazole    Tablet 40 milliGRAM(s) Oral before breakfast      Advanced Directives: Presumed Full Code    VITAL SIGNS, INS/OUTS (Last 24hours):    ICU Vital Signs Last 24 Hrs  T(C): 36.7 (03 Jun 2020 09:00), Max: 36.7 (03 Jun 2020 09:00)  T(F): 98.1 (03 Jun 2020 09:00), Max: 98.1 (03 Jun 2020 09:00)  HR: 69 (03 Jun 2020 09:00) (69 - 69)  BP: 170/66 (03 Jun 2020 09:00) (170/66 - 170/66)  BP(mean): --  ABP: --  ABP(mean): --  RR: 18 (03 Jun 2020 09:00) (18 - 18)  SpO2: 96% (03 Jun 2020 09:00) (96% - 96%)    I&O's Summary      Height (cm): 149.86 (06-03-20)  Weight (kg): 53.1 (06-03-20)  BMI (kg/m2): 23.6 (06-03-20)  BSA (m2): 1.47 (06-03-20)    Physical Exam:   ---------------------------------------------------------------------------------------  GCS:      Exam: A&Ox3, no focal deficits    RESPIRATORY:  Normal expansion/effort  Mechanical Ventilation:     CARDIOVASCULAR:   S1/S2.  RRR  No peripheral edema    GASTROINTESTINAL:  Abdomen soft, non-tender, non-distended    MUSCULOSKELETAL:  Extremities warm, pink, well-perfused.    DERM:  No skin breakdown     :   Exam: Crook catheter in place.       Tubes/Lines/Drains   ----------------------------------------------------------------------------------------------------------  [x] Peripheral IV  [X] Central Venous Line          IJ/Femoral             Date Placed:    [X] Arterial Line		      Radial/Femoral    Date Placed:   [X] PICC:         	  [X] Midline		                                  Date Placed:   [X] Urinary Catheter Crook                                             Date Placed:       LABS  --------------------------------------------------------------------------------------  LFTs      Cardiac Markers        Coagulation      Arterial Blood Gas      Blood Sugar  CAPILLARY BLOOD GLUCOSE          Urinalysis      Cultures          CT/XRAY/ECHO/TCD/EEG  ----------------------------------------------------------------------------------------------            --------------------------------------------------------------------------------------  Admit Diagnosis: I65.23, 08679 SICU Consultation Note  ======================================================================================================  JAZMIN ESPARZA  [id number]    75F w/ PMH of HLD, breast cancer s/p left radial mastectomy, hypothyroidism, HTN, thyroid cancer s/p total thyroidectomy, s/p partial hysterectomy who presents for scheduled left carotid endarterectomy for     Pertinent Imaging:  Echo-  CT-    Procedure:   OR Stats  OR Time: 2 hours       EBL: 50cc         IV Fluids:       Blood Products: 0          UOP: 0     Findings- left carotid plaque    PMH  PAST MEDICAL & SURGICAL HISTORY:  Hypercholesterolemia  Stroke: TIA  Breast cancer: LEFT RADICAL MASTECTECTOMY  Hypothyroidism  HTN (hypertension)  H/O total thyroidectomy  H/O: hysterectomy: PARTIAL    Home Meds:  Home Medications:  aspirin 81 mg oral tablet: 1 tab(s) orally once a day (03 Jun 2020 09:17)  Crestor 20 mg oral tablet: 1 tab(s) orally once a day (03 Jun 2020 09:17)  folic acid 1 mg oral tablet: 1 tab(s) orally once a day (03 Jun 2020 09:17)  lisinopril 20 mg oral tablet: 1 tab(s) orally once a day (03 Jun 2020 09:17)  Synthroid 75 mcg (0.075 mg) oral tablet: 1 tab(s) orally once a day (03 Jun 2020 09:17)  Zoloft:  (03 Jun 2020 09:17)    Allergies  Allergies     Current Medications:  acetaminophen   Tablet .. 650 milliGRAM(s) Oral every 6 hours  atorvastatin 80 milliGRAM(s) Oral at bedtime  chlorhexidine 4% Liquid 1 Application(s) Topical <User Schedule>  folic acid 1 milliGRAM(s) Oral daily  lactated ringers. 1000 milliLiter(s) (75 mL/Hr) IV Continuous <Continuous>  lactated ringers. 1000 milliLiter(s) (70 mL/Hr) IV Continuous <Continuous>  levothyroxine 75 MICROGram(s) Oral daily  lisinopril 20 milliGRAM(s) Oral daily  morphine  - Injectable 2 milliGRAM(s) IV Push every 10 minutes PRN Moderate Pain (4 - 6)  ondansetron Injectable 4 milliGRAM(s) IV Push once PRN Nausea and/or Vomiting  pantoprazole    Tablet 40 milliGRAM(s) Oral before breakfast    Advanced Directives: Presumed Full Code    VITAL SIGNS, INS/OUTS (Last 24hours):    ICU Vital Signs Last 24 Hrs  T(C): 36.7 (03 Jun 2020 09:00), Max: 36.7 (03 Jun 2020 09:00)  T(F): 98.1 (03 Jun 2020 09:00), Max: 98.1 (03 Jun 2020 09:00)  HR: 69 (03 Jun 2020 09:00) (69 - 69)  BP: 170/66 (03 Jun 2020 09:00) (170/66 - 170/66)  RR: 18 (03 Jun 2020 09:00) (18 - 18)  SpO2: 96% (03 Jun 2020 09:00) (96% - 96%)    Height (cm): 149.86 (06-03-20)  Weight (kg): 53.1 (06-03-20)  BMI (kg/m2): 23.6 (06-03-20)  BSA (m2): 1.47 (06-03-20)    Physical Exam:   ---------------------------------------------------------------------------------------  GCS:      Exam: A&Ox3, no focal deficits    RESPIRATORY:  Normal expansion/effort  Mechanical Ventilation:     CARDIOVASCULAR:  S1/S2.  RRR  No peripheral edema    GASTROINTESTINAL:  Abdomen soft, non-tender, non-distended    MUSCULOSKELETAL:  Extremities warm, pink, well-perfused.    DERM:  No skin breakdown     :   Exam: Crook catheter in place.     Tubes/Lines/Drains   ----------------------------------------------------------------------------------------------------------  [X] Peripheral IV   [] Central Venous Line          IJ/Femoral             Date Placed:    [X] Arterial Line		     R Radial Barby    Date Placed: 6/3  [] PICC:         	  [] Midline		                                  Date Placed:   [] Urinary Catheter Crook                                             Date Placed:     LABS  --------------------------------------------------------------------------------------  LFTs      Cardiac Markers        Coagulation      Arterial Blood Gas      Blood Sugar  CAPILLARY BLOOD GLUCOSE          Urinalysis      Cultures          CT/XRAY/ECHO/TCD/EEG  ----------------------------------------------------------------------------------------------            --------------------------------------------------------------------------------------  Admit Diagnosis: I65.23, 96208 SICU Consultation Note  ======================================================================================================  JAZMIN ESPARZA  [id number]    75F w/ PMH of HLD, breast cancer s/p left radial mastectomy, hypothyroidism, HTN, thyroid cancer s/p total thyroidectomy, s/p partial hysterectomy who presents for scheduled left carotid endarterectomy for     Pertinent Imaging:  CT- multifocal b/l moderate ICA stenoses. No acute dissection.     Procedure:   OR Stats  OR Time: 2 hours       EBL: 50cc         IV Fluids:       Blood Products: 0          UOP: 0     Findings- left carotid plaque    PMH  PAST MEDICAL & SURGICAL HISTORY:  Hypercholesterolemia  Stroke: TIA  Breast cancer: LEFT RADICAL MASTECTECTOMY  Hypothyroidism  HTN (hypertension)  H/O total thyroidectomy  H/O: hysterectomy: PARTIAL    Home Meds:  Home Medications:  aspirin 81 mg oral tablet: 1 tab(s) orally once a day (03 Jun 2020 09:17)  Crestor 20 mg oral tablet: 1 tab(s) orally once a day (03 Jun 2020 09:17)  folic acid 1 mg oral tablet: 1 tab(s) orally once a day (03 Jun 2020 09:17)  lisinopril 20 mg oral tablet: 1 tab(s) orally once a day (03 Jun 2020 09:17)  Synthroid 75 mcg (0.075 mg) oral tablet: 1 tab(s) orally once a day (03 Jun 2020 09:17)  Zoloft:  (03 Jun 2020 09:17)    Allergies  Allergies     Current Medications:  acetaminophen   Tablet .. 650 milliGRAM(s) Oral every 6 hours  atorvastatin 80 milliGRAM(s) Oral at bedtime  chlorhexidine 4% Liquid 1 Application(s) Topical <User Schedule>  folic acid 1 milliGRAM(s) Oral daily  lactated ringers. 1000 milliLiter(s) (75 mL/Hr) IV Continuous <Continuous>  lactated ringers. 1000 milliLiter(s) (70 mL/Hr) IV Continuous <Continuous>  levothyroxine 75 MICROGram(s) Oral daily  lisinopril 20 milliGRAM(s) Oral daily  morphine  - Injectable 2 milliGRAM(s) IV Push every 10 minutes PRN Moderate Pain (4 - 6)  ondansetron Injectable 4 milliGRAM(s) IV Push once PRN Nausea and/or Vomiting  pantoprazole    Tablet 40 milliGRAM(s) Oral before breakfast    Advanced Directives: Presumed Full Code    VITAL SIGNS, INS/OUTS (Last 24hours):    ICU Vital Signs Last 24 Hrs  T(C): 36.7 (03 Jun 2020 09:00), Max: 36.7 (03 Jun 2020 09:00)  T(F): 98.1 (03 Jun 2020 09:00), Max: 98.1 (03 Jun 2020 09:00)  HR: 69 (03 Jun 2020 09:00) (69 - 69)  BP: 170/66 (03 Jun 2020 09:00) (170/66 - 170/66)  RR: 18 (03 Jun 2020 09:00) (18 - 18)  SpO2: 96% (03 Jun 2020 09:00) (96% - 96%)    Height (cm): 149.86 (06-03-20)  Weight (kg): 53.1 (06-03-20)  BMI (kg/m2): 23.6 (06-03-20)  BSA (m2): 1.47 (06-03-20)    Physical Exam:   ---------------------------------------------------------------------------------------  GCS: 15     Exam: A&Ox3, no focal deficits    RESPIRATORY:  Normal expansion/effort  Mechanical Ventilation: N/A    CARDIOVASCULAR:  S1/S2.  RRR  No peripheral edema    GASTROINTESTINAL:  Abdomen soft, non-tender, non-distended    MUSCULOSKELETAL:  Extremities warm, pink, well-perfused.    DERM:  No skin breakdown     :   Exam: Crook catheter in place.     Tubes/Lines/Drains   ----------------------------------------------------------------------------------------------------------  [X] Peripheral IV   [] Central Venous Line          IJ/Femoral             Date Placed:    [X] Arterial Line		     R Radial Barby    Date Placed: 6/3  [] PICC:         	  [] Midline		                                  Date Placed:   [] Urinary Catheter Crook                                             Date Placed:     LABS  --------------------------------------------------------------------------------------  LFTs      Cardiac Markers        Coagulation      Arterial Blood Gas      Blood Sugar  CAPILLARY BLOOD GLUCOSE          Urinalysis      Cultures          CT/XRAY/ECHO/TCD/EEG  ----------------------------------------------------------------------------------------------            --------------------------------------------------------------------------------------  Admit Diagnosis: I65.23, 23898 SICU Consultation Note  ======================================================================================================  JAZMIN ESPARZA  [id number]    75F w/ PMH of HLD, breast cancer s/p left radial mastectomy, active smoker, hypothyroidism, HTN, thyroid cancer s/p total thyroidectomy, and s/p partial hysterectomy who presents for scheduled left carotid endarterectomy. Pre-op CTA showed multifocal bilateral moderate ICA stenoses, with the CAROLYNN with 55-60% stenosis and LICA with 52% at the carotid bifurcation, 64% within the proximal ICA, and 60% stenosis within the mid-ICA.     Pertinent Imaging:  < from: CT Angio Neck w/ IV Cont (02.16.20 @ 09:49) >  Right carotid system: Patent. Atherosclerosis, greatest at the bifurcation and proximal internal carotid artery contributing to moderate, approximately 55% and 60%, stenoses at the carotid bifurcation and proximal internal carotid artery, respectively, using NASCET criteria.  No evidence of dissection.    Left carotid system: Patent. Atherosclerosis most prominent at the carotid bifurcation, proximal, and mid internal carotid artery. Multifocal stenoses are identified including moderate (approximately 52%) at the carotid bifurcation, moderate (approximately 64%) within the proximal internal carotid artery and moderate (approximately 60%) within the mid internal carotid artery.  No evidence of dissection.    CTA Neck: Multifocal bilateral moderate internal carotid artery stenoses as detailed above. No evidence of acute dissection.    Severe string-like stenosis of the origin and proximal left vertebral artery.    7 mm left upper lobe ground glass airspace opacity. Follow-up noncontrast chest CT in 6-12 months is recommended to evaluate for persistence/change.    CTA Head: No proximal large vessel occlusion.    Mild to moderate stenoses of the left intracranial vertebral artery.       Procedure: left carotid endarterectomy  OR Stats  OR Time: 2 hours       EBL: 50cc         IV Fluids:       Blood Products: 0          UOP: 0     Findings- left carotid plaque    PMH  PAST MEDICAL & SURGICAL HISTORY:  Hypercholesterolemia  Stroke: TIA  Breast cancer: LEFT RADICAL MASTECTECTOMY  Hypothyroidism  HTN (hypertension)  H/O total thyroidectomy  H/O: hysterectomy: PARTIAL    Home Meds:  Home Medications:  aspirin 81 mg oral tablet: 1 tab(s) orally once a day (03 Jun 2020 09:17)  Crestor 20 mg oral tablet: 1 tab(s) orally once a day (03 Jun 2020 09:17)  folic acid 1 mg oral tablet: 1 tab(s) orally once a day (03 Jun 2020 09:17)  lisinopril 20 mg oral tablet: 1 tab(s) orally once a day (03 Jun 2020 09:17)  Synthroid 75 mcg (0.075 mg) oral tablet: 1 tab(s) orally once a day (03 Jun 2020 09:17)  Zoloft:  (03 Jun 2020 09:17)    Allergies  Allergies     Current Medications:  acetaminophen   Tablet .. 650 milliGRAM(s) Oral every 6 hours  atorvastatin 80 milliGRAM(s) Oral at bedtime  chlorhexidine 4% Liquid 1 Application(s) Topical <User Schedule>  folic acid 1 milliGRAM(s) Oral daily  lactated ringers. 1000 milliLiter(s) (75 mL/Hr) IV Continuous <Continuous>  lactated ringers. 1000 milliLiter(s) (70 mL/Hr) IV Continuous <Continuous>  levothyroxine 75 MICROGram(s) Oral daily  lisinopril 20 milliGRAM(s) Oral daily  morphine  - Injectable 2 milliGRAM(s) IV Push every 10 minutes PRN Moderate Pain (4 - 6)  ondansetron Injectable 4 milliGRAM(s) IV Push once PRN Nausea and/or Vomiting  pantoprazole    Tablet 40 milliGRAM(s) Oral before breakfast    Advanced Directives: Presumed Full Code    VITAL SIGNS, INS/OUTS (Last 24hours):    ICU Vital Signs Last 24 Hrs  T(C): 36.7 (03 Jun 2020 09:00), Max: 36.7 (03 Jun 2020 09:00)  T(F): 98.1 (03 Jun 2020 09:00), Max: 98.1 (03 Jun 2020 09:00)  HR: 69 (03 Jun 2020 09:00) (69 - 69)  BP: 170/66 (03 Jun 2020 09:00) (170/66 - 170/66)  RR: 18 (03 Jun 2020 09:00) (18 - 18)  SpO2: 96% (03 Jun 2020 09:00) (96% - 96%)    Height (cm): 149.86 (06-03-20)  Weight (kg): 53.1 (06-03-20)  BMI (kg/m2): 23.6 (06-03-20)  BSA (m2): 1.47 (06-03-20)    Physical Exam:   ---------------------------------------------------------------------------------------  GCS: 15     Exam: A&Ox3, no focal deficits    HEENT:   L neck dressing w/ mild staining, no palpable hematoma, non-TTP    RESPIRATORY:  Normal expansion/effort  Mechanical Ventilation: N/A    CARDIOVASCULAR:  S1/S2, SBP 150s-160s  No peripheral edema    GASTROINTESTINAL:  Abdomen soft, non-tender, non-distended    MUSCULOSKELETAL:  Extremities warm, pink, well-perfused.    DERM:  No skin breakdown     Tubes/Lines/Drains   ----------------------------------------------------------------------------------------------------------  [X] Peripheral IV   [] Central Venous Line          IJ/Femoral             Date Placed:    [X] Arterial Line		     R Radial Gilman    Date Placed: 6/3  [] PICC:         	  [] Midline		                                  Date Placed:   [] Urinary Catheter Crook                                             Date Placed:     LABS  --------------------------------------------------------------------------------------                    11.7   10.70 )-----------( 196      ( 03 Jun 2020 14:33 )             35.3       Auto Neutrophil %: 83.8 % (06-03-20 @ 14:33)  Auto Immature Granulocyte %: 0.6 % (06-03-20 @ 14:33)    06-03    139  |  107  |  14  ----------------------------<  135<H>  4.2   |  20  |  0.8    Calcium, Total Serum: 8.2 mg/dL (06-03-20 @ 14:33)    Coags:     13.30  ----< 1.16    ( 03 Jun 2020 14:33 )     136.6    CARDIAC MARKERS ( 03 Jun 2020 14:33 )  x     / <0.01 ng/mL / 37 U/L / x     / 1.7 ng/mL      CT/XRAY/ECHO/TCD/EEG    < from: Xray Chest 1 View- PORTABLE-Urgent (06.03.20 @ 15:58) >  Impression:    No radiographic evidence of acute cardiopulmonary disease.  Unchanged.    < end of copied text >  ----------------------------------------------------------------------------------------------  Admit Diagnosis: I65.23, 35140

## 2020-06-03 NOTE — CONSULT NOTE ADULT - ATTENDING COMMENTS
74 y/o female with left ICA stenosis underwent Left CEA today.  Patient is extubated, looks comfortable in PACU.    PE:  AAO x3  Neck: dressing dry, no hematomas.  Chest: clear, slightly decreased breath sounds in bilateral lung bases  CV : rrr  Abdomen: soft.    Assessment:  74 y/o female with Left ICA Stenosis.  S/P Left CEA.  Hypertension.    Plan:  - neuro-vascular checks q1h  - cardiac monitoring  - keep normotensive  - pain control  - monitor surgery site for hematoma  Admit to SICU

## 2020-06-03 NOTE — PRE-ANESTHESIA EVALUATION ADULT - NSANTHOSAYNRD_GEN_A_CORE
Recommend AREDS 2 multivitamin supplements. No. CHRIS screening performed.  STOP BANG Legend: 0-2 = LOW Risk; 3-4 = INTERMEDIATE Risk; 5-8 = HIGH Risk

## 2020-06-03 NOTE — CHART NOTE - NSCHARTNOTEFT_GEN_A_CORE
PACU ANESTHESIA ADMISSION NOTE      Procedure: CEA (carotid endarterectomy)    Post op diagnosis:  Left carotid stenosis      ____  Intubated  TV:______       Rate: ______      FiO2: ______    _x___  Patent Airway    _x___  Full return of protective reflexes    __  Full recovery from anesthesia / back to baseline status    Vitals:  T-98  HR: 71  BP: 135/53  RR: 18 (06-03-20 @ 09:00) (18 - 18)  SpO2: 98%    Mental Status:  _x___ Awake   _____ Alert   _____ Drowsy   _____ Sedated    Nausea/Vomiting:  _x___  NO       ______Yes,   See Post - Op Orders         Pain Scale (0-10):  __0___    Treatment: _x___ None    ____ See Post - Op/PCA Orders    Post - Operative Fluids:   __x__ Oral   ____ See Post - Op Orders    Plan: Discharge:   ____Home       _____Floor     ____x_Critical Care    _____  Other:_________________    Comments: Pt bought to RR spontaneously breathing, hemodynamically stable report given to ICU  No anesthesia issues or complications noted.  Discharge when criteria met.

## 2020-06-03 NOTE — CHART NOTE - NSCHARTNOTEFT_GEN_A_CORE
Post Operative Check    Patient is post op from a CEA (carotid endarterectomy) (15342)   and is resting in bed comfortably     Vitals    T(C): 36.8 (06-03-20 @ 17:00), Max: 36.8 (06-03-20 @ 17:00)  HR: 73 (06-03-20 @ 17:00) (69 - 79)  BP: 157/65 (06-03-20 @ 17:00) (112/48 - 170/66)  RR: 15 (06-03-20 @ 17:00) (12 - 22)  SpO2: 99% (06-03-20 @ 17:00) (96% - 99%)  Wt(kg): --      06-03 @ 07:01  -  06-03 @ 17:40  --------------------------------------------------------  IN:    lactated ringers.: 225 mL  Total IN: 225 mL    OUT:  Total OUT: 0 mL    Total NET: 225 mL          Physical Exam  General: NAD AAOx3   Neck, no hematoma, soft, dressing CDI  Cards: RRR S1S2  Resp: CTAB  Abdomen: soft non tender non distended  Ext: NTBL    Labs  Labs:  CAPILLARY BLOOD GLUCOSE                              11.7   10.70 )-----------( 196      ( 03 Jun 2020 14:33 )             35.3       Auto Neutrophil %: 83.8 % (06-03-20 @ 14:33)  Auto Immature Granulocyte %: 0.6 % (06-03-20 @ 14:33)    06-03    139  |  107  |  14  ----------------------------<  135<H>  4.2   |  20  |  0.8      Calcium, Total Serum: 8.2 mg/dL (06-03-20 @ 14:33)      LFTs:         Coags:     13.30  ----< 1.16    ( 03 Jun 2020 14:33 )     136.6       CARDIAC MARKERS ( 03 Jun 2020 14:33 )  x     / <0.01 ng/mL / 37 U/L / x     / 1.7 ng/mL                  Radiology:       Patient is a 75y old Female s/p CEA  Plan:  SICU care

## 2020-06-03 NOTE — ASU PATIENT PROFILE, ADULT - PMH
Breast cancer  LEFT RADICALMASTECTECTOMY  HTN (hypertension)    Hypercholesterolemia    Hypothyroidism    Stroke  TIA

## 2020-06-04 ENCOUNTER — TRANSCRIPTION ENCOUNTER (OUTPATIENT)
Age: 75
End: 2020-06-04

## 2020-06-04 VITALS — DIASTOLIC BLOOD PRESSURE: 68 MMHG | SYSTOLIC BLOOD PRESSURE: 144 MMHG | RESPIRATION RATE: 20 BRPM | HEART RATE: 72 BPM

## 2020-06-04 LAB
CK MB CFR SERPL CALC: 2.4 NG/ML — SIGNIFICANT CHANGE UP (ref 0.6–6.3)
CK SERPL-CCNC: 84 U/L — SIGNIFICANT CHANGE UP (ref 0–225)
HCT VFR BLD CALC: 35.2 % — LOW (ref 37–47)
HCT VFR BLD CALC: 35.3 % — LOW (ref 37–47)
HGB BLD-MCNC: 12 G/DL — SIGNIFICANT CHANGE UP (ref 12–16)
HGB BLD-MCNC: 12 G/DL — SIGNIFICANT CHANGE UP (ref 12–16)
MCHC RBC-ENTMCNC: 31.2 PG — HIGH (ref 27–31)
MCHC RBC-ENTMCNC: 31.3 PG — HIGH (ref 27–31)
MCHC RBC-ENTMCNC: 34 G/DL — SIGNIFICANT CHANGE UP (ref 32–37)
MCHC RBC-ENTMCNC: 34.1 G/DL — SIGNIFICANT CHANGE UP (ref 32–37)
MCV RBC AUTO: 91.4 FL — SIGNIFICANT CHANGE UP (ref 81–99)
MCV RBC AUTO: 91.9 FL — SIGNIFICANT CHANGE UP (ref 81–99)
NRBC # BLD: 0 /100 WBCS — SIGNIFICANT CHANGE UP (ref 0–0)
NRBC # BLD: 0 /100 WBCS — SIGNIFICANT CHANGE UP (ref 0–0)
PLATELET # BLD AUTO: 176 K/UL — SIGNIFICANT CHANGE UP (ref 130–400)
PLATELET # BLD AUTO: 181 K/UL — SIGNIFICANT CHANGE UP (ref 130–400)
RBC # BLD: 3.84 M/UL — LOW (ref 4.2–5.4)
RBC # BLD: 3.85 M/UL — LOW (ref 4.2–5.4)
RBC # FLD: 13.7 % — SIGNIFICANT CHANGE UP (ref 11.5–14.5)
RBC # FLD: 13.9 % — SIGNIFICANT CHANGE UP (ref 11.5–14.5)
TROPONIN T SERPL-MCNC: <0.01 NG/ML — SIGNIFICANT CHANGE UP
WBC # BLD: 10.89 K/UL — HIGH (ref 4.8–10.8)
WBC # BLD: 11.06 K/UL — HIGH (ref 4.8–10.8)
WBC # FLD AUTO: 10.89 K/UL — HIGH (ref 4.8–10.8)
WBC # FLD AUTO: 11.06 K/UL — HIGH (ref 4.8–10.8)

## 2020-06-04 PROCEDURE — 99233 SBSQ HOSP IP/OBS HIGH 50: CPT

## 2020-06-04 PROCEDURE — 71045 X-RAY EXAM CHEST 1 VIEW: CPT | Mod: 26

## 2020-06-04 RX ORDER — ACETAMINOPHEN 500 MG
2 TABLET ORAL
Qty: 0 | Refills: 0 | DISCHARGE
Start: 2020-06-04

## 2020-06-04 RX ADMIN — Medication 81 MILLIGRAM(S): at 12:05

## 2020-06-04 RX ADMIN — Medication 100 GRAM(S): at 03:52

## 2020-06-04 RX ADMIN — Medication 75 MICROGRAM(S): at 06:01

## 2020-06-04 RX ADMIN — Medication 1 MILLIGRAM(S): at 12:05

## 2020-06-04 RX ADMIN — Medication 2.5 MILLIGRAM(S): at 09:00

## 2020-06-04 RX ADMIN — HEPARIN SODIUM 5000 UNIT(S): 5000 INJECTION INTRAVENOUS; SUBCUTANEOUS at 06:01

## 2020-06-04 RX ADMIN — LISINOPRIL 20 MILLIGRAM(S): 2.5 TABLET ORAL at 06:01

## 2020-06-04 RX ADMIN — Medication 650 MILLIGRAM(S): at 06:01

## 2020-06-04 RX ADMIN — PANTOPRAZOLE SODIUM 40 MILLIGRAM(S): 20 TABLET, DELAYED RELEASE ORAL at 06:01

## 2020-06-04 RX ADMIN — Medication 650 MILLIGRAM(S): at 12:05

## 2020-06-04 NOTE — DISCHARGE NOTE PROVIDER - CARE PROVIDER_API CALL
Tyler Dill  Vascular Surgery  [address] [place] [id number]  Phone: [phone]  Fax: [phone]  Follow Up Time: 2 weeks

## 2020-06-04 NOTE — DISCHARGE NOTE PROVIDER - NSDCACTIVITY_GEN_ALL_CORE
Do not drive or operate machinery/Walking - Outdoors allowed/Walking - Indoors allowed/Showering allowed/Stairs allowed/No heavy lifting/straining

## 2020-06-04 NOTE — PROGRESS NOTE ADULT - SUBJECTIVE AND OBJECTIVE BOX
Procedure:s/p left cea   Patient is a 75y old  Female who presents with a chief complaint of scheduled left CEA (2020 15:13)    PAST MEDICAL & SURGICAL HISTORY:  Hypercholesterolemia  Stroke: TIA  Breast cancer: LEFT RADICALMASTECTECTOMY  Hypothyroidism  HTN (hypertension)  H/O total thyroidectomy  H/O: hysterectomy: PARTIAL      Events of the Last 24h:  Vital Signs Last 24 Hrs  T(C): 35.4 (2020 20:00), Max: 36.8 (2020 17:00)  T(F): 95.7 (2020 20:00), Max: 98.3 (2020 17:00)  HR: 72 (2020 23:00) (69 - 80)  BP: 144/57 (2020 17:45) (112/48 - 170/66)  BP(mean): 74 (2020 14:35) (61 - 74)  RR: 16 (2020 23:00) (10 - 33)  SpO2: 99% (2020 23:00) (96% - 100%)        Diet, Clear Liquid (20 @ 14:33)      I&O's Summary    2020 07:  -  2020 00:31  --------------------------------------------------------  IN: 450 mL / OUT: 300 mL / NET: 150 mL     I&O's Detail    2020 07:  -  2020 00:31  --------------------------------------------------------  IN:    lactated ringers.: 450 mL  Total IN: 450 mL    OUT:    Voided: 300 mL  Total OUT: 300 mL    Total NET: 150 mL          MEDICATIONS  (STANDING):  acetaminophen   Tablet .. 650 milliGRAM(s) Oral every 6 hours  aspirin enteric coated 81 milliGRAM(s) Oral daily  atorvastatin 80 milliGRAM(s) Oral at bedtime  chlorhexidine 4% Liquid 1 Application(s) Topical <User Schedule>  folic acid 1 milliGRAM(s) Oral daily  heparin   Injectable 5000 Unit(s) SubCutaneous every 8 hours  lactated ringers. 1000 milliLiter(s) (50 mL/Hr) IV Continuous <Continuous>  levothyroxine 75 MICROGram(s) Oral daily  lisinopril 20 milliGRAM(s) Oral daily  magnesium sulfate  IVPB 1 Gram(s) IV Intermittent once  pantoprazole    Tablet 40 milliGRAM(s) Oral before breakfast    MEDICATIONS  (PRN):  oxyCODONE    IR 5 milliGRAM(s) Oral every 4 hours PRN Moderate Pain (4 - 6)      PHYSICAL EXAM:    GENERAL: NAD    HEENT: NCAT, left neck incision blood stained no hematoma no bleeding     CHEST/LUNGS: CTAB    HEART: RRR,  No murmurs, rubs, or gallops    ABDOMEN: SNTND +BS    EXTREMITIES:  FROM, No clubbing, cyanosis, or edema, palpable pulse    NEURO: No focal neurological deficits    SKIN: No rashes or lesions    INCISION/WOUNDS:                          11.7   10.70 )-----------( 196      ( 2020 14:33 )             35.3        CBC Full  -  ( 2020 14:33 )  WBC Count : 10.70 K/uL  RBC Count : 3.92 M/uL  Hemoglobin : 11.7 g/dL  Hematocrit : 35.3 %  Platelet Count - Automated : 196 K/uL  Mean Cell Volume : 90.1 fL  Mean Cell Hemoglobin : 29.8 pg  Mean Cell Hemoglobin Concentration : 33.1 g/dL  Auto Neutrophil # : 8.97 K/uL  Auto Lymphocyte # : 1.27 K/uL  Auto Monocyte # : 0.24 K/uL  Auto Eosinophil # : 0.09 K/uL  Auto Basophil # : 0.07 K/uL  Auto Neutrophil % : 83.8 %  Auto Lymphocyte % : 11.9 %  Auto Monocyte % : 2.2 %  Auto Eosinophil % : 0.8 %  Auto Basophil % : 0.7 %               142   |  108   |  13                 Ca: 8.5    BMP:   ----------------------------< 136    M.9   (20 @ 23:00)             4.2    |  23    | 0.7                Ph: 4.4      LFT:     TPro: 6.6 / Alb: 4.2 / TBili: <0.2 / DBili: x / AST: 18 / ALT: 12 / AlkPhos: 68   (20 @ 19:25)      PT/INR - ( 2020 23:00 )   PT: 12.30 sec;   INR: 1.07 ratio         PTT - ( 2020 23:00 )  PTT:30.3 sec  CARDIAC MARKERS ( 2020 20:44 )  x     / <0.01 ng/mL / 75 U/L / x     / 2.6 ng/mL  CARDIAC MARKERS ( 2020 14:33 )  x     / <0.01 ng/mL / 37 U/L / x     / 1.7 ng/mL

## 2020-06-04 NOTE — DISCHARGE NOTE PROVIDER - HOSPITAL COURSE
75F w/ PMH of HLD, breast cancer s/p left radial mastectomy, active smoker, hypothyroidism, HTN, thyroid cancer s/p total thyroidectomy, and s/p partial hysterectomy who presented for scheduled left carotid endarterectomy. Pre-op CTA showed multifocal bilateral moderate ICA stenoses, with the CAROLYNN with 55-60% stenosis and LICA with 52% at the carotid bifurcation, 64% within the proximal ICA, and 60% stenosis within the mid-ICA    Pt underwent left CEA. No intra op and post op complications.     Restarted ASA

## 2020-06-04 NOTE — PROGRESS NOTE ADULT - ASSESSMENT
Assessment & Plan  75F w/ PMH of HLD, breast cancer s/p left radial mastectomy, hypothyroidism, HTN, thyroid cancer s/p total thyroidectomy, and s/p partial hysterectomy who presents POD 0 s/p left carotid endarterectomy.     NEURO:  - Pain: APAP 650mg Q6H, morphine 2 PRN  - Q1H neuro checks  - no neuro deficits    RESP:   - wean off NC to RA as tolerated  - AM CXR  - active smoker      CARDS:   - PMH of HTN: on home lisinopril  - SBP goal 130-150  - if persistently hypertensive, consider PRN pushes of vasotec  - PMH of HLD: home med rosuvastatin, starting atorvastatin tomorrow 6/4  - starting aspirin 81mg tomorrow 6/4  - trend CE: 0.01 >     GI/NUTR:  - diet: CLD, regular in AM  - GI PPX: PTX    /RENAL:   - FU TOV  - BUN/Cr 14/0.8    HEME/ONC:   - DVT PPX: HSQ to start 6/4  - H/H: 11.7 / 35.3  - received 6500 heparin intraoperatively, .6    ID:  - no active issues  - received 2g Ancef pre-operatively    ENDO:  - PMH of thyroid cancer s/p thyroidectomy  - on home levothyroxine    LINES/DRAINS:  PIV, R radial [place], L arm precautions  ACTIVITY: bedrest for 24 hours  DISPO: SICU, d/w Dr. Man Assessment & Plan  75F w/ PMH of HLD, breast cancer s/p left radial mastectomy, hypothyroidism, HTN, thyroid cancer s/p total thyroidectomy, and s/p partial hysterectomy who presents POD 0 s/p left carotid endarterectomy.     NEURO:  - Pain: APAP 650mg Q6H, morphine 2 PRN  - Q1H neuro checks  - no neuro deficits    RESP:   - wean off NC to RA as tolerated  - AM CXR  - active smoker      CARDS:   - PMH of HTN: on home lisinopril  - SBP goal 130-150  - if persistently hypertensive, consider PRN pushes of vasotec  - PMH of HLD: home med rosuvastatin, starting atorvastatin tomorrow 6/4  - started aspirin 6/4  - trend CE: 0.01 x 3, no longer trending    GI/NUTR:  - diet: advanced to DASH diet  - GI PPX: PTX    /RENAL:   - passed TOV  - BUN/Cr 14/0.8 > 13/0.7    HEME/ONC:   - DVT PPX: HSQ started 6/4  - H/H: 11.7 / 35.3 > 12/35  - received 6500 heparin intraoperatively    ID:  - no active issues  - received 2g Ancef pre-operatively    ENDO:  - PMH of thyroid cancer s/p thyroidectomy  - on home levothyroxine    LINES/DRAINS:  PIV, R radial [place], L arm precautions  ACTIVITY: advanced to activity as tolerated

## 2020-06-04 NOTE — DISCHARGE NOTE NURSING/CASE MANAGEMENT/SOCIAL WORK - PATIENT PORTAL LINK FT
You can access the FollowMyHealth Patient Portal offered by [facility] by registering at the following website: [email or web address]. By joining [facility]’s FollowMyHealth portal, you will also be able to view your health information using other applications (apps) compatible with our system.

## 2020-06-04 NOTE — PROGRESS NOTE ADULT - SUBJECTIVE AND OBJECTIVE BOX
JAZMIN ESPARZA  [id number]  75y Female    Indication for ICU admission: s/p L CEA     Admit Date:06-03-20  ICU Date: 6/3  OR Date: 6/3     No Known Allergies    PAST MEDICAL & SURGICAL HISTORY:  Hypercholesterolemia  Stroke: TIA  Breast cancer: LEFT RADICALMASTECTECTOMY  Hypothyroidism  HTN (hypertension)  H/O total thyroidectomy  H/O: hysterectomy: PARTIAL    Home Medications:  aspirin 81 mg oral tablet: 1 tab(s) orally once a day (03 Jun 2020 09:17)  Crestor 20 mg oral tablet: 1 tab(s) orally once a day (03 Jun 2020 09:17)  folic acid 1 mg oral tablet: 1 tab(s) orally once a day (03 Jun 2020 09:17)  lisinopril 20 mg oral tablet: 1 tab(s) orally once a day (03 Jun 2020 09:17)  Synthroid 75 mcg (0.075 mg) oral tablet: 1 tab(s) orally once a day (03 Jun 2020 09:17)  Zoloft:  (03 Jun 2020 09:17)        24HRS EVENT:    Overnight: No acute events. Hemodynamically stable. Repleted Mg 1 g. No hematoma. Neuro intact.     NEURO:  - Pain: Tylenol PRN   - Q1H neuro checks    RESP:   - wean off NC to RA as tolerated  - AM CXR  - active smoker      CARDS:   - PMH of HTN: on home lisinopril  - SBP goal 130-150  - PMH of HLD: restarted atorvastatin   - starting aspirin 81mg tomorrow 6/4  - trend CE: 0.01 > 0.01 >     GI/NUTR:  - diet: CLD, regular in AM  - GI PPX: PTX  - BR, Senna     /RENAL:   - Voiding   - BUN/Cr 13/0.7  Na 142/ K 4.2/ Mg 1.9/ Phos 4.4     HEME/ONC:   - DVT PPX: HSQ to start 6/4  - H/H: 11.7 / 35.3  - received 6500 heparin intraoperatively, .6    ID:  - no active issues  - received 2g Ancef pre-operatively    ENDO:  - PMH of thyroid cancer s/p thyroidectomy  - on home levothyroxine      DVT PTX: SCD     GI PTX: PPI     ***Tubes/Lines/Drains  ***  Peripheral IV  Radial A line       REVIEW OF SYSTEMS    [x] A ten-point review of systems was otherwise negative except as noted.  [ ] Due to altered mental status/intubation, subjective information were not able to be obtained from the patient. History was obtained, to the extent possible, from review of the chart and collateral sources of information. JAZMIN ESPARZA  [id number]  75y Female    Indication for ICU admission: s/p L CEA     Admit Date:06-03-20  ICU Date: 6/3  OR Date: 6/3     No Known Allergies    PAST MEDICAL & SURGICAL HISTORY:  Hypercholesterolemia  Stroke: TIA  Breast cancer: LEFT RADICALMASTECTECTOMY  Hypothyroidism  HTN (hypertension)  H/O total thyroidectomy  H/O: hysterectomy: PARTIAL    Home Medications:  aspirin 81 mg oral tablet: 1 tab(s) orally once a day (03 Jun 2020 09:17)  Crestor 20 mg oral tablet: 1 tab(s) orally once a day (03 Jun 2020 09:17)  folic acid 1 mg oral tablet: 1 tab(s) orally once a day (03 Jun 2020 09:17)  lisinopril 20 mg oral tablet: 1 tab(s) orally once a day (03 Jun 2020 09:17)  Synthroid 75 mcg (0.075 mg) oral tablet: 1 tab(s) orally once a day (03 Jun 2020 09:17)  Zoloft:  (03 Jun 2020 09:17)      24HRS EVENT:    Overnight: No acute events. Hemodynamically stable. Repleted Mg 1 g. No hematoma. Neuro intact. Received 1 x 1.25 vasotec last night for persistent BP to 160s.     NEURO:  - Pain: Tylenol PRN   - Q1H neuro checks    RESP:   - wean off NC to RA as tolerated  - AM CXR  - active smoker      CARDS:   - PMH of HTN: on home lisinopril  - SBP goal 130-150  - PMH of HLD: restarted atorvastatin   - starting aspirin 81mg tomorrow 6/4  - trend CE: 0.01 > 0.01 >     GI/NUTR:  - diet: CLD, regular in AM  - GI PPX: PTX  - BR, Senna     /RENAL:   - Voiding   - BUN/Cr 13/0.7  Na 142/ K 4.2/ Mg 1.9/ Phos 4.4     HEME/ONC:   - DVT PPX: HSQ to start 6/4  - H/H: 11.7 / 35.3  - received 6500 heparin intraoperatively, .6    ID:  - no active issues  - received 2g Ancef pre-operatively    ENDO:  - PMH of thyroid cancer s/p thyroidectomy  - on home levothyroxine      DVT PTX: SCD     GI PTX: PPI     ***Tubes/Lines/Drains  ***  Peripheral IV  Radial A line       REVIEW OF SYSTEMS    [x] A ten-point review of systems was otherwise negative except as noted.  [ ] Due to altered mental status/intubation, subjective information were not able to be obtained from the patient. History was obtained, to the extent possible, from review of the chart and collateral sources of information.

## 2020-06-04 NOTE — DISCHARGE NOTE PROVIDER - NSDCFUADDINST_GEN_ALL_CORE_FT
May shower, remove dressing on Monday, pour water and pat dry, keep sterri strips on, do not cover    Go to ED with headache, fevers, lightheadedness, loss of conciseness, difficulty swallowing or breathing, neck swelling or bleeding

## 2020-06-04 NOTE — PROGRESS NOTE ADULT - ASSESSMENT
ICU management  Neuro checks q1 hour  Strict blood pressure control  Neck checks  Pulse checks   Pain management

## 2020-06-05 LAB — SURGICAL PATHOLOGY STUDY: SIGNIFICANT CHANGE UP

## 2020-06-12 DIAGNOSIS — Z86.73 PERSONAL HISTORY OF TRANSIENT ISCHEMIC ATTACK (TIA), AND CEREBRAL INFARCTION WITHOUT RESIDUAL DEFICITS: ICD-10-CM

## 2020-06-12 DIAGNOSIS — Z85.850 PERSONAL HISTORY OF MALIGNANT NEOPLASM OF THYROID: ICD-10-CM

## 2020-06-12 DIAGNOSIS — Z85.3 PERSONAL HISTORY OF MALIGNANT NEOPLASM OF BREAST: ICD-10-CM

## 2020-06-12 DIAGNOSIS — I10 ESSENTIAL (PRIMARY) HYPERTENSION: ICD-10-CM

## 2020-06-12 DIAGNOSIS — F17.210 NICOTINE DEPENDENCE, CIGARETTES, UNCOMPLICATED: ICD-10-CM

## 2020-06-12 DIAGNOSIS — I65.22 OCCLUSION AND STENOSIS OF LEFT CAROTID ARTERY: ICD-10-CM

## 2020-06-12 DIAGNOSIS — E78.5 HYPERLIPIDEMIA, UNSPECIFIED: ICD-10-CM

## 2020-06-12 DIAGNOSIS — E03.9 HYPOTHYROIDISM, UNSPECIFIED: ICD-10-CM

## 2020-06-12 DIAGNOSIS — I65.23 OCCLUSION AND STENOSIS OF BILATERAL CAROTID ARTERIES: ICD-10-CM

## 2020-06-18 DIAGNOSIS — E89.0 POSTPROCEDURAL HYPOTHYROIDISM: ICD-10-CM

## 2020-06-18 DIAGNOSIS — E78.00 PURE HYPERCHOLESTEROLEMIA, UNSPECIFIED: ICD-10-CM

## 2020-06-19 ENCOUNTER — APPOINTMENT (OUTPATIENT)
Dept: VASCULAR SURGERY | Facility: CLINIC | Age: 75
End: 2020-06-19
Payer: MEDICARE

## 2020-06-19 VITALS — SYSTOLIC BLOOD PRESSURE: 159 MMHG | DIASTOLIC BLOOD PRESSURE: 67 MMHG

## 2020-06-19 PROCEDURE — 99024 POSTOP FOLLOW-UP VISIT: CPT

## 2020-06-19 NOTE — DISCUSSION/SUMMARY
[FreeTextEntry1] : Pt doing well S/P L CEA.  Some mild tongue weakness without deficit related to cranial nerve mobilization and distal extent of disease.  She is eating adequately and has resumed her normal level of activity.\par \par He incision is clean and dry with no abnormal swelling or erythema.\par \par We will see her back in 4 weeks for her 1st postoperative duplex.\par

## 2020-07-13 ENCOUNTER — APPOINTMENT (OUTPATIENT)
Dept: CARDIOLOGY | Facility: CLINIC | Age: 75
End: 2020-07-13
Payer: MEDICARE

## 2020-07-13 PROCEDURE — 99214 OFFICE O/P EST MOD 30 MIN: CPT

## 2020-07-13 PROCEDURE — 93000 ELECTROCARDIOGRAM COMPLETE: CPT

## 2020-07-14 ENCOUNTER — APPOINTMENT (OUTPATIENT)
Dept: VASCULAR SURGERY | Facility: CLINIC | Age: 75
End: 2020-07-14

## 2020-08-04 ENCOUNTER — APPOINTMENT (OUTPATIENT)
Dept: VASCULAR SURGERY | Facility: CLINIC | Age: 75
End: 2020-08-04
Payer: MEDICARE

## 2020-08-04 VITALS — SYSTOLIC BLOOD PRESSURE: 155 MMHG | DIASTOLIC BLOOD PRESSURE: 51 MMHG

## 2020-08-04 PROCEDURE — 99024 POSTOP FOLLOW-UP VISIT: CPT

## 2020-08-04 PROCEDURE — 93880 EXTRACRANIAL BILAT STUDY: CPT

## 2020-08-04 NOTE — ASSESSMENT
[FreeTextEntry1] : The patient is 8 weeks so left ICA she tolerated the procedure well and presents for her initial postoperative duplex. The duplex shows a patent left CEA and a right 50-69 % stenosis. I recommend she continue with ASA and lipid lowering agent daily and follow up in 6 months for a repeat carotid duplex.

## 2020-10-16 ENCOUNTER — RECORD ABSTRACTING (OUTPATIENT)
Age: 75
End: 2020-10-16

## 2020-10-16 DIAGNOSIS — Z87.898 PERSONAL HISTORY OF OTHER SPECIFIED CONDITIONS: ICD-10-CM

## 2020-10-16 RX ORDER — CHROMIUM 200 MCG
TABLET ORAL
Refills: 0 | Status: ACTIVE | COMMUNITY

## 2020-10-16 RX ORDER — ASPIRIN 81 MG
81 TABLET, DELAYED RELEASE (ENTERIC COATED) ORAL DAILY
Refills: 0 | Status: ACTIVE | COMMUNITY

## 2020-10-16 RX ORDER — LISINOPRIL 20 MG/1
20 TABLET ORAL DAILY
Refills: 0 | Status: ACTIVE | COMMUNITY

## 2020-10-16 RX ORDER — ROSUVASTATIN CALCIUM 40 MG/1
40 TABLET, FILM COATED ORAL DAILY
Refills: 0 | Status: ACTIVE | COMMUNITY

## 2020-10-16 RX ORDER — SERTRALINE HYDROCHLORIDE 50 MG/1
50 TABLET, FILM COATED ORAL DAILY
Refills: 0 | Status: ACTIVE | COMMUNITY

## 2020-12-09 ENCOUNTER — APPOINTMENT (OUTPATIENT)
Dept: CARDIOLOGY | Facility: CLINIC | Age: 75
End: 2020-12-09
Payer: MEDICARE

## 2020-12-09 PROCEDURE — 99072 ADDL SUPL MATRL&STAF TM PHE: CPT

## 2020-12-09 PROCEDURE — 93306 TTE W/DOPPLER COMPLETE: CPT

## 2021-01-11 ENCOUNTER — APPOINTMENT (OUTPATIENT)
Dept: CARDIOLOGY | Facility: CLINIC | Age: 76
End: 2021-01-11
Payer: MEDICARE

## 2021-01-11 VITALS
HEIGHT: 59 IN | SYSTOLIC BLOOD PRESSURE: 160 MMHG | HEART RATE: 85 BPM | BODY MASS INDEX: 25.4 KG/M2 | WEIGHT: 126 LBS | TEMPERATURE: 97.6 F | DIASTOLIC BLOOD PRESSURE: 62 MMHG

## 2021-01-11 PROCEDURE — 99072 ADDL SUPL MATRL&STAF TM PHE: CPT

## 2021-01-11 PROCEDURE — 93000 ELECTROCARDIOGRAM COMPLETE: CPT

## 2021-01-11 PROCEDURE — 99213 OFFICE O/P EST LOW 20 MIN: CPT

## 2021-01-11 NOTE — ASSESSMENT
[FreeTextEntry1] : 2+-3+ AI Class I\par Normal coronaries\par LBBB\par (-) thall  and 2016\par under stress brother  and lost house lukasz other brother mouth cancer\par HTN\par Hypercholesterolemia\par CVA \par MoD MR and AI Muga scan Ef 62%\par ClassI\par s/p L CEA Deitsch still needs right\par she has lung nodule which is being followed \par thall(-) and echo 2-3+ AI normal EF and LV dimensions\par lung nodule decreasing in size seen [facility] [place]\par echo 2018 hert size bigger EF still good on MUGA\par AAA (-)\par f/u echo  no change 2+ MR\par LDL 72 no change needs to take crestor daily\par echo 2020  EF 45% due to LBBB and 2+ AI P1/2 300-400 and Mod MR No change \par Bp up but not taking med every day discussed with the patient \par stable for right carotid if need surg

## 2021-01-11 NOTE — PHYSICAL EXAM
[General Appearance - Well Developed] : well developed [Normal Appearance] : normal appearance [Well Groomed] : well groomed [General Appearance - Well Nourished] : well nourished [No Deformities] : no deformities [General Appearance - In No Acute Distress] : no acute distress [Normal Conjunctiva] : the conjunctiva exhibited no abnormalities [Eyelids - No Xanthelasma] : the eyelids demonstrated no xanthelasmas [Normal Oral Mucosa] : normal oral mucosa [No Oral Pallor] : no oral pallor [No Oral Cyanosis] : no oral cyanosis [Heart Rate And Rhythm] : heart rate and rhythm were normal [Heart Sounds] : normal S1 and S2 [Murmurs] : no murmurs present [Arterial Pulses Normal] : the arterial pulses were normal [Edema] : no peripheral edema present [Bowel Sounds] : normal bowel sounds [Abdomen Soft] : soft [Abdomen Mass (___ Cm)] : no abdominal mass palpated [Nail Clubbing] : no clubbing of the fingernails [Cyanosis, Localized] : no localized cyanosis [Oriented To Time, Place, And Person] : oriented to person, place, and time [FreeTextEntry1] : No JVD left carotid scar

## 2021-02-09 ENCOUNTER — APPOINTMENT (OUTPATIENT)
Dept: VASCULAR SURGERY | Facility: CLINIC | Age: 76
End: 2021-02-09
Payer: MEDICARE

## 2021-02-09 VITALS — DIASTOLIC BLOOD PRESSURE: 65 MMHG | TEMPERATURE: 98 F | SYSTOLIC BLOOD PRESSURE: 135 MMHG

## 2021-02-09 PROCEDURE — 99213 OFFICE O/P EST LOW 20 MIN: CPT

## 2021-02-09 PROCEDURE — 99072 ADDL SUPL MATRL&STAF TM PHE: CPT

## 2021-02-09 PROCEDURE — 93880 EXTRACRANIAL BILAT STUDY: CPT

## 2021-02-09 NOTE — REASON FOR VISIT
[Follow-Up: _____] : a [unfilled] follow-up visit [Other: _____] : [unfilled] [FreeTextEntry1] : 6/3/20 S/P L CEA 8 months  ago

## 2021-02-09 NOTE — CONSULT LETTER
[Dear  ___] : Dear  [unfilled], [Please see my note below.] : Please see my note below. [FreeTextEntry2] : Dr. Brito

## 2021-02-09 NOTE — ASSESSMENT
[FreeTextEntry1] : The patient is 8 months s/P left ICA she tolerated the procedure well and presents for a follow up duplex. The duplex shows a patent left CEA and a right 70% stenosis. I would like to schedule her for a right CEA. I want the patient to see ENT for clearance on the vocal cords, She will also need to see cardiology for pre operative clearance. I  recommend she continue with ASA and lipid lowering

## 2021-02-16 ENCOUNTER — APPOINTMENT (OUTPATIENT)
Dept: OTOLARYNGOLOGY | Facility: CLINIC | Age: 76
End: 2021-02-16
Payer: MEDICARE

## 2021-02-16 DIAGNOSIS — I65.23 OCCLUSION AND STENOSIS OF BILATERAL CAROTID ARTERIES: ICD-10-CM

## 2021-02-16 PROCEDURE — 99072 ADDL SUPL MATRL&STAF TM PHE: CPT

## 2021-02-16 PROCEDURE — 99202 OFFICE O/P NEW SF 15 MIN: CPT | Mod: 25

## 2021-02-16 PROCEDURE — 31575 DIAGNOSTIC LARYNGOSCOPY: CPT

## 2021-02-16 NOTE — HISTORY OF PRESENT ILLNESS
[de-identified] : Patient presents today for medical clearance for upcoming carotid artery surgery. Patient here to check vocal cords. Patient denies any hoarseness. No dysphagia. No choking. No sore throat. Patient is a current smoker.

## 2021-02-18 ENCOUNTER — OUTPATIENT (OUTPATIENT)
Dept: OUTPATIENT SERVICES | Facility: HOSPITAL | Age: 76
LOS: 1 days | Discharge: HOME | End: 2021-02-18
Payer: MEDICARE

## 2021-02-18 VITALS
SYSTOLIC BLOOD PRESSURE: 180 MMHG | TEMPERATURE: 98 F | OXYGEN SATURATION: 97 % | HEIGHT: 59 IN | WEIGHT: 119.93 LBS | RESPIRATION RATE: 16 BRPM | DIASTOLIC BLOOD PRESSURE: 74 MMHG | HEART RATE: 80 BPM

## 2021-02-18 DIAGNOSIS — Z01.818 ENCOUNTER FOR OTHER PREPROCEDURAL EXAMINATION: ICD-10-CM

## 2021-02-18 DIAGNOSIS — Z90.10 ACQUIRED ABSENCE OF UNSPECIFIED BREAST AND NIPPLE: Chronic | ICD-10-CM

## 2021-02-18 DIAGNOSIS — Z98.890 OTHER SPECIFIED POSTPROCEDURAL STATES: Chronic | ICD-10-CM

## 2021-02-18 DIAGNOSIS — I65.23 OCCLUSION AND STENOSIS OF BILATERAL CAROTID ARTERIES: ICD-10-CM

## 2021-02-18 DIAGNOSIS — Z90.710 ACQUIRED ABSENCE OF BOTH CERVIX AND UTERUS: Chronic | ICD-10-CM

## 2021-02-18 DIAGNOSIS — E89.0 POSTPROCEDURAL HYPOTHYROIDISM: Chronic | ICD-10-CM

## 2021-02-18 LAB
ALBUMIN SERPL ELPH-MCNC: 4.4 G/DL — SIGNIFICANT CHANGE UP (ref 3.5–5.2)
ALP SERPL-CCNC: 87 U/L — SIGNIFICANT CHANGE UP (ref 30–115)
ALT FLD-CCNC: 10 U/L — SIGNIFICANT CHANGE UP (ref 0–41)
ANION GAP SERPL CALC-SCNC: 12 MMOL/L — SIGNIFICANT CHANGE UP (ref 7–14)
APPEARANCE UR: CLEAR — SIGNIFICANT CHANGE UP
APTT BLD: 40.1 SEC — HIGH (ref 27–39.2)
AST SERPL-CCNC: 17 U/L — SIGNIFICANT CHANGE UP (ref 0–41)
BACTERIA # UR AUTO: NEGATIVE — SIGNIFICANT CHANGE UP
BASOPHILS # BLD AUTO: 0.11 K/UL — SIGNIFICANT CHANGE UP (ref 0–0.2)
BASOPHILS NFR BLD AUTO: 1.2 % — HIGH (ref 0–1)
BILIRUB SERPL-MCNC: 0.2 MG/DL — SIGNIFICANT CHANGE UP (ref 0.2–1.2)
BILIRUB UR-MCNC: NEGATIVE — SIGNIFICANT CHANGE UP
BLD GP AB SCN SERPL QL: SIGNIFICANT CHANGE UP
BUN SERPL-MCNC: 16 MG/DL — SIGNIFICANT CHANGE UP (ref 10–20)
CALCIUM SERPL-MCNC: 9.2 MG/DL — SIGNIFICANT CHANGE UP (ref 8.5–10.1)
CHLORIDE SERPL-SCNC: 105 MMOL/L — SIGNIFICANT CHANGE UP (ref 98–110)
CO2 SERPL-SCNC: 26 MMOL/L — SIGNIFICANT CHANGE UP (ref 17–32)
COLOR SPEC: YELLOW — SIGNIFICANT CHANGE UP
CREAT SERPL-MCNC: 0.7 MG/DL — SIGNIFICANT CHANGE UP (ref 0.7–1.5)
DIFF PNL FLD: ABNORMAL
EOSINOPHIL # BLD AUTO: 0.23 K/UL — SIGNIFICANT CHANGE UP (ref 0–0.7)
EOSINOPHIL NFR BLD AUTO: 2.5 % — SIGNIFICANT CHANGE UP (ref 0–8)
EPI CELLS # UR: 2 /HPF — SIGNIFICANT CHANGE UP (ref 0–5)
GLUCOSE SERPL-MCNC: 76 MG/DL — SIGNIFICANT CHANGE UP (ref 70–99)
GLUCOSE UR QL: NEGATIVE — SIGNIFICANT CHANGE UP
HCT VFR BLD CALC: 44.3 % — SIGNIFICANT CHANGE UP (ref 37–47)
HGB BLD-MCNC: 14.5 G/DL — SIGNIFICANT CHANGE UP (ref 12–16)
HYALINE CASTS # UR AUTO: 2 /LPF — SIGNIFICANT CHANGE UP (ref 0–7)
IMM GRANULOCYTES NFR BLD AUTO: 0.2 % — SIGNIFICANT CHANGE UP (ref 0.1–0.3)
INR BLD: 1.03 RATIO — SIGNIFICANT CHANGE UP (ref 0.65–1.3)
KETONES UR-MCNC: NEGATIVE — SIGNIFICANT CHANGE UP
LEUKOCYTE ESTERASE UR-ACNC: NEGATIVE — SIGNIFICANT CHANGE UP
LYMPHOCYTES # BLD AUTO: 2.69 K/UL — SIGNIFICANT CHANGE UP (ref 1.2–3.4)
LYMPHOCYTES # BLD AUTO: 28.7 % — SIGNIFICANT CHANGE UP (ref 20.5–51.1)
MCHC RBC-ENTMCNC: 29.9 PG — SIGNIFICANT CHANGE UP (ref 27–31)
MCHC RBC-ENTMCNC: 32.7 G/DL — SIGNIFICANT CHANGE UP (ref 32–37)
MCV RBC AUTO: 91.3 FL — SIGNIFICANT CHANGE UP (ref 81–99)
MONOCYTES # BLD AUTO: 0.59 K/UL — SIGNIFICANT CHANGE UP (ref 0.1–0.6)
MONOCYTES NFR BLD AUTO: 6.3 % — SIGNIFICANT CHANGE UP (ref 1.7–9.3)
NEUTROPHILS # BLD AUTO: 5.74 K/UL — SIGNIFICANT CHANGE UP (ref 1.4–6.5)
NEUTROPHILS NFR BLD AUTO: 61.1 % — SIGNIFICANT CHANGE UP (ref 42.2–75.2)
NITRITE UR-MCNC: NEGATIVE — SIGNIFICANT CHANGE UP
NRBC # BLD: 0 /100 WBCS — SIGNIFICANT CHANGE UP (ref 0–0)
PH UR: 5.5 — SIGNIFICANT CHANGE UP (ref 5–8)
PLATELET # BLD AUTO: 292 K/UL — SIGNIFICANT CHANGE UP (ref 130–400)
POTASSIUM SERPL-MCNC: 4.8 MMOL/L — SIGNIFICANT CHANGE UP (ref 3.5–5)
POTASSIUM SERPL-SCNC: 4.8 MMOL/L — SIGNIFICANT CHANGE UP (ref 3.5–5)
PROT SERPL-MCNC: 7.5 G/DL — SIGNIFICANT CHANGE UP (ref 6–8)
PROT UR-MCNC: ABNORMAL
PROTHROM AB SERPL-ACNC: 11.8 SEC — SIGNIFICANT CHANGE UP (ref 9.95–12.87)
RBC # BLD: 4.85 M/UL — SIGNIFICANT CHANGE UP (ref 4.2–5.4)
RBC # FLD: 13.3 % — SIGNIFICANT CHANGE UP (ref 11.5–14.5)
RBC CASTS # UR COMP ASSIST: 2 /HPF — SIGNIFICANT CHANGE UP (ref 0–4)
SODIUM SERPL-SCNC: 143 MMOL/L — SIGNIFICANT CHANGE UP (ref 135–146)
SP GR SPEC: 1.03 — SIGNIFICANT CHANGE UP (ref 1.01–1.03)
UROBILINOGEN FLD QL: SIGNIFICANT CHANGE UP
WBC # BLD: 9.38 K/UL — SIGNIFICANT CHANGE UP (ref 4.8–10.8)
WBC # FLD AUTO: 9.38 K/UL — SIGNIFICANT CHANGE UP (ref 4.8–10.8)
WBC UR QL: 7 /HPF — HIGH (ref 0–5)

## 2021-02-18 PROCEDURE — 93010 ELECTROCARDIOGRAM REPORT: CPT

## 2021-02-18 RX ORDER — ROSUVASTATIN CALCIUM 5 MG/1
1 TABLET ORAL
Qty: 0 | Refills: 0 | DISCHARGE

## 2021-02-18 RX ORDER — LEVOTHYROXINE SODIUM 125 MCG
1 TABLET ORAL
Qty: 0 | Refills: 0 | DISCHARGE

## 2021-02-18 RX ORDER — SERTRALINE 25 MG/1
0 TABLET, FILM COATED ORAL
Qty: 0 | Refills: 0 | DISCHARGE

## 2021-02-18 NOTE — H&P PST ADULT - NSICDXPASTSURGICALHX_GEN_ALL_CORE_FT
PAST SURGICAL HISTORY:  H/O carotid endarterectomy LT- 2020    H/O mastectomy LEFT    H/O total thyroidectomy cancer    H/O: hysterectomy PARTIAL

## 2021-02-18 NOTE — H&P PST ADULT - NSICDXPASTMEDICALHX_GEN_ALL_CORE_FT
PAST MEDICAL HISTORY:  Breast cancer LEFT RADICALMASTECTECTOMY    HTN (hypertension)     Hypercholesterolemia     Hypothyroidism     Stroke TIA-2002

## 2021-02-18 NOTE — H&P PST ADULT - HISTORY OF PRESENT ILLNESS
75yr old female states she was being monitored for carotid stenosis after her stroke-s/p LT CEA 2020-was found to have significant RT Carotid stenosis- presents to pretesting today-is scheduled for RT carotid endarterectomy with patch. Denies COVID S/S. Denies sick contacts. Verbalized understanding of COVID preventive measures. Exercise colten 3-4 flat blocks.  Anesthesia Alert  NO--Difficult Airway  NO--History of neck surgery or radiation  NO--Limited ROM of neck  NO--History of Malignant hyperthermia  NO--Personal or family history of Pseudocholinesterase deficiency  Reports difficulty breathing--Prior Anesthesia Complication  NO--Latex Allergy  NO--Loose teeth  NO--History of Rheumatoid Arthritis  NO--CHRIS  YES KYPHOSIS____

## 2021-03-07 ENCOUNTER — OUTPATIENT (OUTPATIENT)
Dept: OUTPATIENT SERVICES | Facility: HOSPITAL | Age: 76
LOS: 1 days | Discharge: HOME | End: 2021-03-07

## 2021-03-07 ENCOUNTER — LABORATORY RESULT (OUTPATIENT)
Age: 76
End: 2021-03-07

## 2021-03-07 DIAGNOSIS — Z90.10 ACQUIRED ABSENCE OF UNSPECIFIED BREAST AND NIPPLE: Chronic | ICD-10-CM

## 2021-03-07 DIAGNOSIS — Z90.710 ACQUIRED ABSENCE OF BOTH CERVIX AND UTERUS: Chronic | ICD-10-CM

## 2021-03-07 DIAGNOSIS — E89.0 POSTPROCEDURAL HYPOTHYROIDISM: Chronic | ICD-10-CM

## 2021-03-07 DIAGNOSIS — Z98.890 OTHER SPECIFIED POSTPROCEDURAL STATES: Chronic | ICD-10-CM

## 2021-03-07 DIAGNOSIS — Z11.59 ENCOUNTER FOR SCREENING FOR OTHER VIRAL DISEASES: ICD-10-CM

## 2021-03-07 PROBLEM — I63.9 CEREBRAL INFARCTION, UNSPECIFIED: Chronic | Status: ACTIVE | Noted: 2020-03-11

## 2021-03-10 ENCOUNTER — INPATIENT (INPATIENT)
Facility: HOSPITAL | Age: 76
LOS: 1 days | Discharge: HOME | End: 2021-03-12
Attending: SURGERY | Admitting: SURGERY
Payer: MEDICARE

## 2021-03-10 ENCOUNTER — RESULT REVIEW (OUTPATIENT)
Age: 76
End: 2021-03-10

## 2021-03-10 ENCOUNTER — APPOINTMENT (OUTPATIENT)
Dept: VASCULAR SURGERY | Facility: HOSPITAL | Age: 76
End: 2021-03-10

## 2021-03-10 VITALS
OXYGEN SATURATION: 97 % | WEIGHT: 119.05 LBS | RESPIRATION RATE: 17 BRPM | DIASTOLIC BLOOD PRESSURE: 67 MMHG | HEART RATE: 68 BPM | SYSTOLIC BLOOD PRESSURE: 183 MMHG | TEMPERATURE: 98 F | HEIGHT: 59 IN

## 2021-03-10 DIAGNOSIS — Z79.82 LONG TERM (CURRENT) USE OF ASPIRIN: ICD-10-CM

## 2021-03-10 DIAGNOSIS — E83.42 HYPOMAGNESEMIA: ICD-10-CM

## 2021-03-10 DIAGNOSIS — Z90.710 ACQUIRED ABSENCE OF BOTH CERVIX AND UTERUS: ICD-10-CM

## 2021-03-10 DIAGNOSIS — I65.23 OCCLUSION AND STENOSIS OF BILATERAL CAROTID ARTERIES: ICD-10-CM

## 2021-03-10 DIAGNOSIS — Z98.890 OTHER SPECIFIED POSTPROCEDURAL STATES: Chronic | ICD-10-CM

## 2021-03-10 DIAGNOSIS — Z86.73 PERSONAL HISTORY OF TRANSIENT ISCHEMIC ATTACK (TIA), AND CEREBRAL INFARCTION WITHOUT RESIDUAL DEFICITS: ICD-10-CM

## 2021-03-10 DIAGNOSIS — Z90.710 ACQUIRED ABSENCE OF BOTH CERVIX AND UTERUS: Chronic | ICD-10-CM

## 2021-03-10 DIAGNOSIS — I65.21 OCCLUSION AND STENOSIS OF RIGHT CAROTID ARTERY: ICD-10-CM

## 2021-03-10 DIAGNOSIS — E78.00 PURE HYPERCHOLESTEROLEMIA, UNSPECIFIED: ICD-10-CM

## 2021-03-10 DIAGNOSIS — I10 ESSENTIAL (PRIMARY) HYPERTENSION: ICD-10-CM

## 2021-03-10 DIAGNOSIS — Z90.12 ACQUIRED ABSENCE OF LEFT BREAST AND NIPPLE: ICD-10-CM

## 2021-03-10 DIAGNOSIS — I44.7 LEFT BUNDLE-BRANCH BLOCK, UNSPECIFIED: ICD-10-CM

## 2021-03-10 DIAGNOSIS — Z90.10 ACQUIRED ABSENCE OF UNSPECIFIED BREAST AND NIPPLE: Chronic | ICD-10-CM

## 2021-03-10 DIAGNOSIS — Z85.3 PERSONAL HISTORY OF MALIGNANT NEOPLASM OF BREAST: ICD-10-CM

## 2021-03-10 DIAGNOSIS — G89.18 OTHER ACUTE POSTPROCEDURAL PAIN: ICD-10-CM

## 2021-03-10 DIAGNOSIS — E89.0 POSTPROCEDURAL HYPOTHYROIDISM: ICD-10-CM

## 2021-03-10 DIAGNOSIS — Z85.850 PERSONAL HISTORY OF MALIGNANT NEOPLASM OF THYROID: ICD-10-CM

## 2021-03-10 DIAGNOSIS — I95.9 HYPOTENSION, UNSPECIFIED: ICD-10-CM

## 2021-03-10 DIAGNOSIS — E89.0 POSTPROCEDURAL HYPOTHYROIDISM: Chronic | ICD-10-CM

## 2021-03-10 LAB
ABO RH CONFIRMATION: SIGNIFICANT CHANGE UP
ANION GAP SERPL CALC-SCNC: 9 MMOL/L — SIGNIFICANT CHANGE UP (ref 7–14)
ANION GAP SERPL CALC-SCNC: 9 MMOL/L — SIGNIFICANT CHANGE UP (ref 7–14)
BASOPHILS # BLD AUTO: 0.08 K/UL — SIGNIFICANT CHANGE UP (ref 0–0.2)
BASOPHILS NFR BLD AUTO: 1 % — SIGNIFICANT CHANGE UP (ref 0–1)
BLD GP AB SCN SERPL QL: SIGNIFICANT CHANGE UP
BUN SERPL-MCNC: 17 MG/DL — SIGNIFICANT CHANGE UP (ref 10–20)
BUN SERPL-MCNC: 17 MG/DL — SIGNIFICANT CHANGE UP (ref 10–20)
CALCIUM SERPL-MCNC: 8.3 MG/DL — LOW (ref 8.5–10.1)
CALCIUM SERPL-MCNC: 8.3 MG/DL — LOW (ref 8.5–10.1)
CHLORIDE SERPL-SCNC: 106 MMOL/L — SIGNIFICANT CHANGE UP (ref 98–110)
CHLORIDE SERPL-SCNC: 107 MMOL/L — SIGNIFICANT CHANGE UP (ref 98–110)
CK MB CFR SERPL CALC: 1.4 NG/ML — SIGNIFICANT CHANGE UP (ref 0.6–6.3)
CK MB CFR SERPL CALC: 1.7 NG/ML — SIGNIFICANT CHANGE UP (ref 0.6–6.3)
CK SERPL-CCNC: 26 U/L — SIGNIFICANT CHANGE UP (ref 0–225)
CK SERPL-CCNC: 29 U/L — SIGNIFICANT CHANGE UP (ref 0–225)
CO2 SERPL-SCNC: 23 MMOL/L — SIGNIFICANT CHANGE UP (ref 17–32)
CO2 SERPL-SCNC: 23 MMOL/L — SIGNIFICANT CHANGE UP (ref 17–32)
CREAT SERPL-MCNC: 0.6 MG/DL — LOW (ref 0.7–1.5)
CREAT SERPL-MCNC: 0.7 MG/DL — SIGNIFICANT CHANGE UP (ref 0.7–1.5)
EOSINOPHIL # BLD AUTO: 0.13 K/UL — SIGNIFICANT CHANGE UP (ref 0–0.7)
EOSINOPHIL NFR BLD AUTO: 1.6 % — SIGNIFICANT CHANGE UP (ref 0–8)
GLUCOSE SERPL-MCNC: 101 MG/DL — HIGH (ref 70–99)
GLUCOSE SERPL-MCNC: 117 MG/DL — HIGH (ref 70–99)
HCT VFR BLD CALC: 35.4 % — LOW (ref 37–47)
HGB BLD-MCNC: 11.8 G/DL — LOW (ref 12–16)
IMM GRANULOCYTES NFR BLD AUTO: 0.4 % — HIGH (ref 0.1–0.3)
LYMPHOCYTES # BLD AUTO: 1.38 K/UL — SIGNIFICANT CHANGE UP (ref 1.2–3.4)
LYMPHOCYTES # BLD AUTO: 16.6 % — LOW (ref 20.5–51.1)
MAGNESIUM SERPL-MCNC: 1.7 MG/DL — LOW (ref 1.8–2.4)
MCHC RBC-ENTMCNC: 30.3 PG — SIGNIFICANT CHANGE UP (ref 27–31)
MCHC RBC-ENTMCNC: 33.3 G/DL — SIGNIFICANT CHANGE UP (ref 32–37)
MCV RBC AUTO: 90.8 FL — SIGNIFICANT CHANGE UP (ref 81–99)
MONOCYTES # BLD AUTO: 0.32 K/UL — SIGNIFICANT CHANGE UP (ref 0.1–0.6)
MONOCYTES NFR BLD AUTO: 3.8 % — SIGNIFICANT CHANGE UP (ref 1.7–9.3)
NEUTROPHILS # BLD AUTO: 6.39 K/UL — SIGNIFICANT CHANGE UP (ref 1.4–6.5)
NEUTROPHILS NFR BLD AUTO: 76.6 % — HIGH (ref 42.2–75.2)
NRBC # BLD: 0 /100 WBCS — SIGNIFICANT CHANGE UP (ref 0–0)
PHOSPHATE SERPL-MCNC: 3.6 MG/DL — SIGNIFICANT CHANGE UP (ref 2.1–4.9)
PLATELET # BLD AUTO: 200 K/UL — SIGNIFICANT CHANGE UP (ref 130–400)
POTASSIUM SERPL-MCNC: 4.1 MMOL/L — SIGNIFICANT CHANGE UP (ref 3.5–5)
POTASSIUM SERPL-MCNC: 4.2 MMOL/L — SIGNIFICANT CHANGE UP (ref 3.5–5)
POTASSIUM SERPL-SCNC: 4.1 MMOL/L — SIGNIFICANT CHANGE UP (ref 3.5–5)
POTASSIUM SERPL-SCNC: 4.2 MMOL/L — SIGNIFICANT CHANGE UP (ref 3.5–5)
RBC # BLD: 3.9 M/UL — LOW (ref 4.2–5.4)
RBC # FLD: 13.6 % — SIGNIFICANT CHANGE UP (ref 11.5–14.5)
SODIUM SERPL-SCNC: 138 MMOL/L — SIGNIFICANT CHANGE UP (ref 135–146)
SODIUM SERPL-SCNC: 139 MMOL/L — SIGNIFICANT CHANGE UP (ref 135–146)
TROPONIN T SERPL-MCNC: <0.01 NG/ML — SIGNIFICANT CHANGE UP
TROPONIN T SERPL-MCNC: <0.01 NG/ML — SIGNIFICANT CHANGE UP
WBC # BLD: 8.33 K/UL — SIGNIFICANT CHANGE UP (ref 4.8–10.8)
WBC # FLD AUTO: 8.33 K/UL — SIGNIFICANT CHANGE UP (ref 4.8–10.8)

## 2021-03-10 PROCEDURE — 99222 1ST HOSP IP/OBS MODERATE 55: CPT

## 2021-03-10 PROCEDURE — 88311 DECALCIFY TISSUE: CPT | Mod: 26

## 2021-03-10 PROCEDURE — 71045 X-RAY EXAM CHEST 1 VIEW: CPT | Mod: 26

## 2021-03-10 PROCEDURE — 35301 RECHANNELING OF ARTERY: CPT

## 2021-03-10 PROCEDURE — 88304 TISSUE EXAM BY PATHOLOGIST: CPT | Mod: 26

## 2021-03-10 PROCEDURE — 93010 ELECTROCARDIOGRAM REPORT: CPT

## 2021-03-10 RX ORDER — LISINOPRIL 2.5 MG/1
20 TABLET ORAL DAILY
Refills: 0 | Status: DISCONTINUED | OUTPATIENT
Start: 2021-03-10 | End: 2021-03-10

## 2021-03-10 RX ORDER — LISINOPRIL 2.5 MG/1
1 TABLET ORAL
Qty: 0 | Refills: 0 | DISCHARGE

## 2021-03-10 RX ORDER — ASCORBIC ACID 60 MG
0 TABLET,CHEWABLE ORAL
Qty: 0 | Refills: 0 | DISCHARGE

## 2021-03-10 RX ORDER — MAGNESIUM SULFATE 500 MG/ML
1 VIAL (ML) INJECTION ONCE
Refills: 0 | Status: COMPLETED | OUTPATIENT
Start: 2021-03-10 | End: 2021-03-10

## 2021-03-10 RX ORDER — SENNA PLUS 8.6 MG/1
2 TABLET ORAL AT BEDTIME
Refills: 0 | Status: DISCONTINUED | OUTPATIENT
Start: 2021-03-10 | End: 2021-03-12

## 2021-03-10 RX ORDER — FOLIC ACID 0.8 MG
1 TABLET ORAL
Qty: 0 | Refills: 0 | DISCHARGE

## 2021-03-10 RX ORDER — ASPIRIN/CALCIUM CARB/MAGNESIUM 324 MG
81 TABLET ORAL ONCE
Refills: 0 | Status: COMPLETED | OUTPATIENT
Start: 2021-03-10 | End: 2021-03-10

## 2021-03-10 RX ORDER — ASPIRIN/CALCIUM CARB/MAGNESIUM 324 MG
81 TABLET ORAL DAILY
Refills: 0 | Status: DISCONTINUED | OUTPATIENT
Start: 2021-03-11 | End: 2021-03-12

## 2021-03-10 RX ORDER — ASPIRIN/CALCIUM CARB/MAGNESIUM 324 MG
1 TABLET ORAL
Qty: 0 | Refills: 0 | DISCHARGE

## 2021-03-10 RX ORDER — OXYCODONE AND ACETAMINOPHEN 5; 325 MG/1; MG/1
1 TABLET ORAL EVERY 4 HOURS
Refills: 0 | Status: DISCONTINUED | OUTPATIENT
Start: 2021-03-10 | End: 2021-03-10

## 2021-03-10 RX ORDER — MAGNESIUM SULFATE 500 MG/ML
2 VIAL (ML) INJECTION ONCE
Refills: 0 | Status: COMPLETED | OUTPATIENT
Start: 2021-03-10 | End: 2021-03-10

## 2021-03-10 RX ORDER — FOLIC ACID 0.8 MG
1 TABLET ORAL DAILY
Refills: 0 | Status: DISCONTINUED | OUTPATIENT
Start: 2021-03-10 | End: 2021-03-12

## 2021-03-10 RX ORDER — LABETALOL HCL 100 MG
10 TABLET ORAL ONCE
Refills: 0 | Status: COMPLETED | OUTPATIENT
Start: 2021-03-10 | End: 2021-03-10

## 2021-03-10 RX ORDER — SERTRALINE 25 MG/1
50 TABLET, FILM COATED ORAL DAILY
Refills: 0 | Status: DISCONTINUED | OUTPATIENT
Start: 2021-03-10 | End: 2021-03-12

## 2021-03-10 RX ORDER — OXYCODONE HYDROCHLORIDE 5 MG/1
5 TABLET ORAL ONCE
Refills: 0 | Status: DISCONTINUED | OUTPATIENT
Start: 2021-03-10 | End: 2021-03-10

## 2021-03-10 RX ORDER — LEVOTHYROXINE SODIUM 125 MCG
1 TABLET ORAL
Qty: 0 | Refills: 0 | DISCHARGE

## 2021-03-10 RX ORDER — ACETAMINOPHEN 500 MG
650 TABLET ORAL EVERY 6 HOURS
Refills: 0 | Status: DISCONTINUED | OUTPATIENT
Start: 2021-03-10 | End: 2021-03-12

## 2021-03-10 RX ORDER — LEVOTHYROXINE SODIUM 125 MCG
25 TABLET ORAL DAILY
Refills: 0 | Status: DISCONTINUED | OUTPATIENT
Start: 2021-03-10 | End: 2021-03-12

## 2021-03-10 RX ORDER — SODIUM CHLORIDE 9 MG/ML
250 INJECTION, SOLUTION INTRAVENOUS ONCE
Refills: 0 | Status: COMPLETED | OUTPATIENT
Start: 2021-03-10 | End: 2021-03-10

## 2021-03-10 RX ORDER — SODIUM CHLORIDE 9 MG/ML
1000 INJECTION, SOLUTION INTRAVENOUS
Refills: 0 | Status: DISCONTINUED | OUTPATIENT
Start: 2021-03-10 | End: 2021-03-10

## 2021-03-10 RX ORDER — HYDROMORPHONE HYDROCHLORIDE 2 MG/ML
0.5 INJECTION INTRAMUSCULAR; INTRAVENOUS; SUBCUTANEOUS
Refills: 0 | Status: DISCONTINUED | OUTPATIENT
Start: 2021-03-10 | End: 2021-03-10

## 2021-03-10 RX ORDER — SERTRALINE 25 MG/1
1 TABLET, FILM COATED ORAL
Qty: 0 | Refills: 0 | DISCHARGE

## 2021-03-10 RX ORDER — ROSUVASTATIN CALCIUM 5 MG/1
1 TABLET ORAL
Qty: 0 | Refills: 0 | DISCHARGE

## 2021-03-10 RX ORDER — CHOLECALCIFEROL (VITAMIN D3) 125 MCG
0 CAPSULE ORAL
Qty: 0 | Refills: 0 | DISCHARGE

## 2021-03-10 RX ORDER — ACETAMINOPHEN 500 MG
1000 TABLET ORAL ONCE
Refills: 0 | Status: COMPLETED | OUTPATIENT
Start: 2021-03-10 | End: 2021-03-10

## 2021-03-10 RX ORDER — ATORVASTATIN CALCIUM 80 MG/1
80 TABLET, FILM COATED ORAL AT BEDTIME
Refills: 0 | Status: DISCONTINUED | OUTPATIENT
Start: 2021-03-10 | End: 2021-03-12

## 2021-03-10 RX ORDER — PANTOPRAZOLE SODIUM 20 MG/1
40 TABLET, DELAYED RELEASE ORAL
Refills: 0 | Status: DISCONTINUED | OUTPATIENT
Start: 2021-03-10 | End: 2021-03-12

## 2021-03-10 RX ADMIN — Medication 650 MILLIGRAM(S): at 23:15

## 2021-03-10 RX ADMIN — Medication 400 MILLIGRAM(S): at 12:13

## 2021-03-10 RX ADMIN — Medication 650 MILLIGRAM(S): at 23:45

## 2021-03-10 RX ADMIN — Medication 81 MILLIGRAM(S): at 12:13

## 2021-03-10 RX ADMIN — SODIUM CHLORIDE 500 MILLILITER(S): 9 INJECTION, SOLUTION INTRAVENOUS at 16:00

## 2021-03-10 RX ADMIN — SODIUM CHLORIDE 75 MILLILITER(S): 9 INJECTION, SOLUTION INTRAVENOUS at 12:25

## 2021-03-10 RX ADMIN — ATORVASTATIN CALCIUM 80 MILLIGRAM(S): 80 TABLET, FILM COATED ORAL at 21:52

## 2021-03-10 RX ADMIN — Medication 50 GRAM(S): at 13:21

## 2021-03-10 RX ADMIN — Medication 1000 MILLIGRAM(S): at 12:43

## 2021-03-10 RX ADMIN — Medication 50 GRAM(S): at 16:30

## 2021-03-10 NOTE — CONSULT NOTE ADULT - ASSESSMENT
ASSESSMENT:  75 yo Female with PMH of HTN, TIA (2002) Hypothyroidism, Hypercholesterolemia, LBBB, Breast cancer, and current everyday smoker. PSH L CEA 06/2020, Left Mastectomy, Total Thyroidectomy, and Partial Hysterectomy and EF 45%, AS, MR on Pre-op echo s/p R CEA        PLAN:   Neurologic: Hx of TIA   - restarted home sertraline 50mcg  - s/p R CEA  - Q1 neurovascular checks   - Pain controlled with Tylenol    Respiratory:   - GETA, extubated post op, on NC, wean to RA as tolerated  - Encourage IS   - F/u CXR    Cardiovascular: Hx of HLD  - Start ASA tomorrow 81mg  - BP goal 110-160  - Trops x1 negative, F/U Cardiac enzymes x2  - Resume Rosuvastatin 40mg, Lisinopril 20mg    Gastrointestinal/Nutrition:   - Regular Diet  - GI PPX: PPI, senna     Renal/Genitourinary:   - No panchal, tov  - BUN/Cr - 17/0.6  - Monitor electrolytes  - hypomagnesemia repleted     Hematologic:   - DVT ppx: hold today per primary team, given 5000U Heparin in OR  - STAT ASA 81mg given in PACU, Start ASA 81mg Tomorrow  - F/U CBC, Coags    Infectious Disease:   - 1g Ancef given Intra-op  -monitor for leukocytosis, fever  -WBC --> 8.33    Lines/Tubes:  - R 18g IV  - R arterial A-line    Endocrine: Hx of hypothyroid, s/p thyroidectomy  - Resume Levothyroxine 25mcg        Disposition: SICU

## 2021-03-10 NOTE — ASU PATIENT PROFILE, ADULT - NS TRANSFER DENTURES
Partial bottom dentures, full upper dentures- removed in [place] as per patient/Partial/Full/Upper/Lower

## 2021-03-10 NOTE — ASU PATIENT PROFILE, ADULT - PMH
Breast cancer  LEFT RADICALMASTECTECTOMY  HTN (hypertension)    Hypercholesterolemia    Hypothyroidism    Stroke  TIA-2002

## 2021-03-10 NOTE — CONSULT NOTE ADULT - SUBJECTIVE AND OBJECTIVE BOX
SICU Consultation Note  =====================================================  HPI: 76y Female   HPI: 75 yo Female with PMH of HTN, TIA (2002, L CEA 2020) Hypothyroidism, Hypercholesterolemia, LBBB presents today s/p R CEA for significant R carotid stenosis.   Patent appears     Surgery Information  OR time: 2 hrs      EBL: 100cc        IV Fluids: 1200cc      Blood Products: none  UOP:    no panchal      PAST MEDICAL & SURGICAL HISTORY:  Hypercholesterolemia    Stroke  TIA-2002    Breast cancer  LEFT RADICALMASTECTECTOMY    Hypothyroidism    HTN (hypertension)    H/O mastectomy  LEFT    H/O carotid endarterectomy  LT- 2020    H/O total thyroidectomy  cancer    H/O: hysterectomy  PARTIAL      Home Meds: Home Medications:  aspirin 81 mg oral tablet: 1 tab(s) orally once a day (10 Mar 2021 06:31)  Crestor 40 mg oral tablet: 1 tab(s) orally once a day (10 Mar 2021 06:31)  folic acid 1 mg oral tablet: 1 tab(s) orally once a day (10 Mar 2021 06:31)  lisinopril 20 mg oral tablet: 1 tab(s) orally once a day (10 Mar 2021 06:31)  Synthroid 25 mcg (0.025 mg) oral tablet: 1 tab(s) orally once a day (10 Mar 2021 06:31)  Vitamin C:  (10 Mar 2021 06:31)  Vitamin D3:  (10 Mar 2021 06:31)  Zoloft 50 mg oral tablet: 1 tab(s) orally once a day (10 Mar 2021 06:31)    Allergies: Allergies    No Known Allergies    Intolerances      Soc:   Advanced Directives: Presumed Full Code     ROS:    REVIEW OF SYSTEMS    [ ] A ten-point review of systems was otherwise negative except as noted.  [ ] Due to altered mental status/intubation, subjective information were not able to be obtained from the patient. History was obtained, to the extent possible, from review of the chart and collateral sources of information.      CURRENT MEDICATIONS:   --------------------------------------------------------------------------------------  Neurologic Medications  acetaminophen   Tablet .. 650 milliGRAM(s) Oral every 6 hours PRN Mild Pain (1 - 3)  sertraline 50 milliGRAM(s) Oral daily    Respiratory Medications    Cardiovascular Medications  lisinopril 20 milliGRAM(s) Oral daily    Gastrointestinal Medications  folic acid 1 milliGRAM(s) Oral daily  lactated ringers. 1000 milliLiter(s) IV Continuous <Continuous>  magnesium sulfate  IVPB 2 Gram(s) IV Intermittent once    Genitourinary Medications    Hematologic/Oncologic Medications    Antimicrobial/Immunologic Medications    Endocrine/Metabolic Medications  atorvastatin 80 milliGRAM(s) Oral at bedtime  levothyroxine 25 MICROGram(s) Oral daily    Topical/Other Medications    --------------------------------------------------------------------------------------    VITAL SIGNS, INS/OUTS (last 24 hours):  --------------------------------------------------------------------------------------  ICU Vital Signs Last 24 Hrs  T(C): 37 (10 Mar 2021 11:30), Max: 37 (10 Mar 2021 11:30)  T(F): 98.6 (10 Mar 2021 11:30), Max: 98.6 (10 Mar 2021 11:30)  HR: 76 (10 Mar 2021 13:00) (66 - 76)  BP: 115/49 (10 Mar 2021 12:30) (115/49 - 183/67)  BP(mean): --  ABP: 121/32 (10 Mar 2021 12:30) (121/32 - 140/55)  ABP(mean): --  RR: 18 (10 Mar 2021 13:00) (15 - 29)  SpO2: 98% (10 Mar 2021 13:00) (93% - 98%)    I&O's Summary    --------------------------------------------------------------------------------------    EXAM:  General/Neuro  RASS:   GCS:   Exam: Normal, NAD, alert, oriented x 3, no focal deficits. PERRLA  ***    Respiratory  Exam: Lungs clear to auscultation, Normal expansion/effort.  ***  [] Tracheostomy   [] Intubated  Mechanical Ventilation:     Cardiovascular  Exam: S1, S2.  Regular rate and rhythm.  Peripheral edema  ***  Cardiac Rhythm: Normal Sinus Rhythm  ECHO:     GI  Exam: Abdomen soft, Non-tender, Non-distended.  Gastrostomy / Jejunostomy tube in place.  Nasogastric tube in place.  Colostomy / Ileostomy.  ***  Wound:   ***  Current Diet:  NPO***      Tubes/Lines/Drains  ***  [x] Peripheral IV  [] Central Venous Line     	[] R	[] L	[] IJ	[] Fem	[] SC        Type:	    Date Placed:   [] Arterial Line		[] R	[] L	[] Fem	[] Rad	[] Ax	Date Placed:   [] PICC:         	[] Midline		[] Mediport           [] Urinary Catheter		Date Placed:     Extremities  Exam: Extremities warm, pink, well-perfused.        Derm:  Exam: Good skin turgor, no skin breakdown.      :   Exam: Panchal catheter in place.     LABS  --------------------------------------------------------------------------------------  Labs:  CAPILLARY BLOOD GLUCOSE                              11.8   8.33  )-----------( 200      ( 10 Mar 2021 12:05 )             35.4       Auto Neutrophil %: 76.6 % (03-10-21 @ 12:05)  Auto Immature Granulocyte %: 0.4 % (03-10-21 @ 12:05)    03-10    139  |  107  |  17  ----------------------------<  117<H>  4.1   |  23  |  0.7      Calcium, Total Serum: 8.3 mg/dL (03-10-21 @ 12:05)      LFTs:         Coags:    CARDIAC MARKERS ( 10 Mar 2021 12:05 )  x     / <0.01 ng/mL / 26 U/L / x     / 1.7 ng/mL                --------------------------------------------------------------------------------------    OTHER LABS    IMAGING RESULTS      ASSESSMENT:  76y Female ***    PLAN:   Neurologic:   Respiratory:   Cardiovascular:   Gastrointestinal/Nutrition:   Renal/Genitourinary:   Hematologic:   Infectious Disease:   Lines/Tubes:  Endocrine:   Disposition:     --------------------------------------------------------------------------------------    Critical Care Diagnoses:     SICU Consultation Note  =====================================================   HPI:   75 yo Female with PMH of HTN, TIA (2002) Hypothyroidism, Hypercholesterolemia, LBBB,  and Breast cancer, PSH L CEA 06/2020, Left Mastectomy (Left Limb Precautions), Thyroidectomy, and Partial Hysterectomy and EF 45% on Pre-op echo. Patient with asymptomatic significant R carotid stenosis.     Patient presents today for elective Right Carotid Endarterectomy.   Intra op: 5000U Heparin, no protamine, and 1g Ancef intraop. Hemodynamically stable during the case.   Patient was given ASA 81mg In PACU. Patent appears well without complaints.     Surgery Information  OR time: 2.5 hrs      EBL: 100cc        IV Fluids: 1200cc      Blood Products: none    UOP: no panchal      PAST MEDICAL & SURGICAL HISTORY:  Hypercholesterolemia  Stroke TIA-2002  Breast cancer LEFT RADICALMASTECTECTOMY  Hypothyroidism  HTN (hypertension)  H/O L mastectomy  H/O carotid endarterectomy (LT- 2020)  H/O total thyroidectomy  cancer  H/O: Partial hysterectomy        Home Meds: Home Medications:  aspirin 81 mg oral tablet: 1 tab(s) orally once a day (10 Mar 2021 06:31)  Crestor 40 mg oral tablet: 1 tab(s) orally once a day (10 Mar 2021 06:31)  folic acid 1 mg oral tablet: 1 tab(s) orally once a day (10 Mar 2021 06:31)  lisinopril 20 mg oral tablet: 1 tab(s) orally once a day (10 Mar 2021 06:31)  Synthroid 25 mcg (0.025 mg) oral tablet: 1 tab(s) orally once a day (10 Mar 2021 06:31)  Vitamin C:  (10 Mar 2021 06:31)  Vitamin D3:  (10 Mar 2021 06:31)  Zoloft 50 mg oral tablet: 1 tab(s) orally once a day (10 Mar 2021 06:31)    Allergies: Allergies    No Known Allergies    Intolerances      Soc:   Advanced Directives: Presumed Full Code     ROS:    REVIEW OF SYSTEMS    [x] A ten-point review of systems was otherwise negative except as noted.  [ ] Due to altered mental status/intubation, subjective information were not able to be obtained from the patient. History was obtained, to the extent possible, from review of the chart and collateral sources of information.      CURRENT MEDICATIONS:   --------------------------------------------------------------------------------------  Neurologic Medications  acetaminophen   Tablet .. 650 milliGRAM(s) Oral every 6 hours PRN Mild Pain (1 - 3)  sertraline 50 milliGRAM(s) Oral daily    Respiratory Medications    Cardiovascular Medications  lisinopril 20 milliGRAM(s) Oral daily    Gastrointestinal Medications  folic acid 1 milliGRAM(s) Oral daily  lactated ringers. 1000 milliLiter(s) IV Continuous <Continuous>  magnesium sulfate  IVPB 2 Gram(s) IV Intermittent once    Genitourinary Medications    Hematologic/Oncologic Medications    Antimicrobial/Immunologic Medications    Endocrine/Metabolic Medications  atorvastatin 80 milliGRAM(s) Oral at bedtime  levothyroxine 25 MICROGram(s) Oral daily    Topical/Other Medications    --------------------------------------------------------------------------------------    VITAL SIGNS, INS/OUTS (last 24 hours):  --------------------------------------------------------------------------------------  ICU Vital Signs Last 24 Hrs  T(C): 37 (10 Mar 2021 11:30), Max: 37 (10 Mar 2021 11:30)  T(F): 98.6 (10 Mar 2021 11:30), Max: 98.6 (10 Mar 2021 11:30)  HR: 76 (10 Mar 2021 13:00) (66 - 76)  BP: 115/49 (10 Mar 2021 12:30) (115/49 - 183/67)  BP(mean): --  ABP: 121/32 (10 Mar 2021 12:30) (121/32 - 140/55)  ABP(mean): --  RR: 18 (10 Mar 2021 13:00) (15 - 29)  SpO2: 98% (10 Mar 2021 13:00) (93% - 98%)    I&O's Summary    --------------------------------------------------------------------------------------    EXAM:  General/Neuro  Exam: Normal, NAD, alert, no focal deficits, no tongue deviation    Respiratory  Exam: Lungs clear to auscultation, Normal expansion/effort.  Sat 98 on NC    Cardiovascular  Exam: S1, S2.  Regular rate and rhythm. No Peripheral edema   Cardiac Rhythm: Normal Sinus Rhythm  ECHO: EF 45%, Aortic Stenosis, Mitral Regurgitation    GI  Exam: Abdomen soft, Non-tender, Non-distended.    Wound:   Blood noted on dressing, no swelling  Current Diet: Regular Diet      Extremities  Exam: Extremities warm, pink, well-perfused.  DP pulses bilaterally with doppler      Derm:  Exam: Good skin turgor, no skin breakdown.      :   Exam: No Panchal      Tubes/Lines/Drains    [x] Peripheral IV R 18g  [ x] Right radial A-line  LABS  --------------------------------------------------------------------------------------  Labs:  CAPILLARY BLOOD GLUCOSE                              11.8   8.33  )-----------( 200      ( 10 Mar 2021 12:05 )             35.4       Auto Neutrophil %: 76.6 % (03-10-21 @ 12:05)  Auto Immature Granulocyte %: 0.4 % (03-10-21 @ 12:05)    03-10    139  |  107  |  17  ----------------------------<  117<H>  4.1   |  23  |  0.7      Calcium, Total Serum: 8.3 mg/dL (03-10-21 @ 12:05)       Coags:    CARDIAC MARKERS ( 10 Mar 2021 12:05 )  x     / <0.01 ng/mL / 26 U/L / x     / 1.7 ng/mL                --------------------------------------------------------------------------------------    OTHER LABS    IMAGING RESULTS

## 2021-03-10 NOTE — ASU PATIENT PROFILE, ADULT - PSH
H/O carotid endarterectomy  LT- 2020  H/O mastectomy  LEFT  H/O total thyroidectomy  cancer  H/O: hysterectomy  PARTIAL

## 2021-03-10 NOTE — CHART NOTE - NSCHARTNOTEFT_GEN_A_CORE
PACU ANESTHESIA ADMISSION NOTE      Procedure: Right carotid endarterectomy      Post op diagnosis:  Carotid stenosis, right        ____  Intubated  TV:______       Rate: ______      FiO2: ______    __x__  Patent Airway    __x__  Full return of protective reflexes    __x__  Full recovery from anesthesia / back to baseline status    Vitals:  T(C): 37 (03-10-21 @ 11:30), Max: 37 (03-10-21 @ 11:30)  HR: 66 (03-10-21 @ 11:45) (66 - 68)  BP: 130/55 (03-10-21 @ 11:35) (129/53 - 183/67)  RR: 29 (03-10-21 @ 11:45) (17 - 29)  SpO2: 97% (03-10-21 @ 11:45) (97% - 97%)    Mental Status:  __x__ Awake   _____ Alert   _____ Drowsy   _____ Sedated    Nausea/Vomiting:  __x__ NO  ______Yes,   See Post - Op Orders          Pain Scale (0-10):  __3___    Treatment: ____ None    ____ See Post - Op/PCA Orders    Post - Operative Fluids:   ____ Oral   __3__ See Post - Op Orders    Plan: Discharge:   ____Home       _____Floor     ___3__Critical Care    _____  Other:_________________    Comments: uneventful anesthesia course no complications. VItals stable. Pt transferred to PACU and signed out to SICU.
Vascular Surgery Post-Op Note,  SPECTRA [id number]    CC:  Pre-Op Dx: I65.23, [id number]    Stenosis of right carotid artery      Procedure: Right carotid endarterectomy      Surgeon: Dr. Dill    Subjective:  pt feels good, denies any complains    Vital Signs Last 24 Hrs  T(C): 36.8 (10 Mar 2021 13:00), Max: 37 (10 Mar 2021 11:30)  T(F): 98.3 (10 Mar 2021 13:00), Max: 98.6 (10 Mar 2021 11:30)  HR: 74 (10 Mar 2021 14:00) (66 - 76)  BP: 119/50 (10 Mar 2021 14:00) (115/49 - 183/67)  BP(mean): --  RR: 22 (10 Mar 2021 14:00) (15 - 29)  SpO2: 96% (10 Mar 2021 14:00) (93% - 98%)    Physical Exam:  General: NAD, resting comfortably in bed  Right neck: + dressing slightly stained, soft neck  Pulmonary: Nonlabored breathing, no respiratory distress  Cardiovascular: NSR  Abdominal: soft, NT/ND  Extremities: WWP, normal strength  Skin: no hematoma, rash, ecchymosis  Neuro: A/O x 3, CNs II-XII grossly intact, normal motor/sensation, no focal deficits      LABS:                        11.8   8.33  )-----------( 200      ( 10 Mar 2021 12:05 )             35.4     03-10    138  |  106  |  17  ----------------------------<  101<H>  4.2   |  23  |  0.6<L>    Ca    8.3<L>      10 Mar 2021 13:20  Phos  3.6     03-10  Mg     1.7     03-10        CAPILLARY BLOOD GLUCOSE              Radiology and Additional Studies:    Assessment:76y Female s/p above procedure    Plan:  Pain/nausea control PRN  Home meds  Incentive spirometer  bed rest  Vitals per protocol   Diet: DASH  AM labs

## 2021-03-10 NOTE — CONSULT NOTE ADULT - ATTENDING COMMENTS
Patient is S/P Right CEA.  AAO x3  Neurologically intact.  Slightly decreased breath sounds in bilateral lung bases.    ASSESSMENT:  75 y/o female with Right Carotid Artery Stenosis.  S/P Right CEA.  HTN  Acute postoperative pain.  Atelectasis.    PLAN:  - neurovascular checks q1h  - monitor surgery side  - pain control  - incentive spirometer  - use O2 2L with NC  - keep normotensive  - GI and DVT prophylaxis    This note reflects my exam and care of this patient on 03/10.2021 Patient is S/P Right CEA.  AAO x3  Neurologically intact.  Slightly decreased breath sounds in bilateral lung bases.    ASSESSMENT:  75 y/o female with Right Carotid Artery Stenosis.  S/P Right CEA.  HTN  Acute postoperative pain.  Atelectasis.    PLAN:  - neurovascular checks q1h  - monitor surgery side  - pain control  - incentive spirometer  - use O2 2L with NC  - keep normotensive  - GI and DVT prophylaxis    This note reflects my exam and care of this patient on 03/10/2021

## 2021-03-10 NOTE — ASU PREOP CHECKLIST - 1.
Left arm precautions secondary to h/o left mastectomy. Left arm precautions secondary to h/o left mastectomy. OR RN April made aware.

## 2021-03-11 ENCOUNTER — TRANSCRIPTION ENCOUNTER (OUTPATIENT)
Age: 76
End: 2021-03-11

## 2021-03-11 VITALS — HEART RATE: 75 BPM | RESPIRATION RATE: 16 BRPM | OXYGEN SATURATION: 95 %

## 2021-03-11 LAB
ANION GAP SERPL CALC-SCNC: 10 MMOL/L — SIGNIFICANT CHANGE UP (ref 7–14)
BUN SERPL-MCNC: 14 MG/DL — SIGNIFICANT CHANGE UP (ref 10–20)
CALCIUM SERPL-MCNC: 8.3 MG/DL — LOW (ref 8.5–10.1)
CHLORIDE SERPL-SCNC: 108 MMOL/L — SIGNIFICANT CHANGE UP (ref 98–110)
CO2 SERPL-SCNC: 25 MMOL/L — SIGNIFICANT CHANGE UP (ref 17–32)
CREAT SERPL-MCNC: 0.7 MG/DL — SIGNIFICANT CHANGE UP (ref 0.7–1.5)
GLUCOSE SERPL-MCNC: 88 MG/DL — SIGNIFICANT CHANGE UP (ref 70–99)
HCT VFR BLD CALC: 34.6 % — LOW (ref 37–47)
HGB BLD-MCNC: 11 G/DL — LOW (ref 12–16)
MAGNESIUM SERPL-MCNC: 2.5 MG/DL — HIGH (ref 1.8–2.4)
MCHC RBC-ENTMCNC: 29.2 PG — SIGNIFICANT CHANGE UP (ref 27–31)
MCHC RBC-ENTMCNC: 31.8 G/DL — LOW (ref 32–37)
MCV RBC AUTO: 91.8 FL — SIGNIFICANT CHANGE UP (ref 81–99)
NRBC # BLD: 0 /100 WBCS — SIGNIFICANT CHANGE UP (ref 0–0)
PHOSPHATE SERPL-MCNC: 4.2 MG/DL — SIGNIFICANT CHANGE UP (ref 2.1–4.9)
PLATELET # BLD AUTO: 179 K/UL — SIGNIFICANT CHANGE UP (ref 130–400)
POTASSIUM SERPL-MCNC: 4.2 MMOL/L — SIGNIFICANT CHANGE UP (ref 3.5–5)
POTASSIUM SERPL-SCNC: 4.2 MMOL/L — SIGNIFICANT CHANGE UP (ref 3.5–5)
RBC # BLD: 3.77 M/UL — LOW (ref 4.2–5.4)
RBC # FLD: 13.7 % — SIGNIFICANT CHANGE UP (ref 11.5–14.5)
SODIUM SERPL-SCNC: 143 MMOL/L — SIGNIFICANT CHANGE UP (ref 135–146)
SURGICAL PATHOLOGY STUDY: SIGNIFICANT CHANGE UP
TROPONIN T SERPL-MCNC: <0.01 NG/ML — SIGNIFICANT CHANGE UP
WBC # BLD: 10.04 K/UL — SIGNIFICANT CHANGE UP (ref 4.8–10.8)
WBC # FLD AUTO: 10.04 K/UL — SIGNIFICANT CHANGE UP (ref 4.8–10.8)

## 2021-03-11 PROCEDURE — 93010 ELECTROCARDIOGRAM REPORT: CPT

## 2021-03-11 PROCEDURE — 99291 CRITICAL CARE FIRST HOUR: CPT

## 2021-03-11 PROCEDURE — 71045 X-RAY EXAM CHEST 1 VIEW: CPT | Mod: 26

## 2021-03-11 RX ORDER — LISINOPRIL 2.5 MG/1
20 TABLET ORAL DAILY
Refills: 0 | Status: DISCONTINUED | OUTPATIENT
Start: 2021-03-11 | End: 2021-03-11

## 2021-03-11 RX ORDER — LISINOPRIL 2.5 MG/1
40 TABLET ORAL DAILY
Refills: 0 | Status: DISCONTINUED | OUTPATIENT
Start: 2021-03-12 | End: 2021-03-12

## 2021-03-11 RX ORDER — LABETALOL HCL 100 MG
10 TABLET ORAL ONCE
Refills: 0 | Status: COMPLETED | OUTPATIENT
Start: 2021-03-11 | End: 2021-03-11

## 2021-03-11 RX ORDER — LISINOPRIL 2.5 MG/1
20 TABLET ORAL ONCE
Refills: 0 | Status: COMPLETED | OUTPATIENT
Start: 2021-03-11 | End: 2021-03-11

## 2021-03-11 RX ORDER — ACETAMINOPHEN 500 MG
2 TABLET ORAL
Qty: 0 | Refills: 0 | DISCHARGE
Start: 2021-03-11

## 2021-03-11 RX ADMIN — SERTRALINE 50 MILLIGRAM(S): 25 TABLET, FILM COATED ORAL at 11:55

## 2021-03-11 RX ADMIN — Medication 25 MICROGRAM(S): at 05:59

## 2021-03-11 RX ADMIN — Medication 10 MILLIGRAM(S): at 10:50

## 2021-03-11 RX ADMIN — Medication 81 MILLIGRAM(S): at 11:56

## 2021-03-11 RX ADMIN — LISINOPRIL 20 MILLIGRAM(S): 2.5 TABLET ORAL at 11:56

## 2021-03-11 RX ADMIN — PANTOPRAZOLE SODIUM 40 MILLIGRAM(S): 20 TABLET, DELAYED RELEASE ORAL at 06:00

## 2021-03-11 RX ADMIN — Medication 1 MILLIGRAM(S): at 11:56

## 2021-03-11 RX ADMIN — Medication 10 MILLIGRAM(S): at 00:05

## 2021-03-11 RX ADMIN — LISINOPRIL 20 MILLIGRAM(S): 2.5 TABLET ORAL at 08:17

## 2021-03-11 NOTE — DISCHARGE NOTE NURSING/CASE MANAGEMENT/SOCIAL WORK - NSDCPETBCESMAN_GEN_ALL_CORE
1/29/18 Patient been called has an appt on 1/31/18. If you are a smoker, it is important for your health to stop smoking. Please be aware that second hand smoke is also harmful.

## 2021-03-11 NOTE — DISCHARGE NOTE PROVIDER - NSDCFUADDINST_GEN_ALL_CORE_FT
You may shower. Remove top dressing Monday. May pour water over it, pat dry, and keep incision clean and dry    Go to ED with neck oozing, swelling, difficulty breathing, swallowing, weakness, dizziness, fevers or chills

## 2021-03-11 NOTE — PROGRESS NOTE ADULT - ASSESSMENT
ASSESSMENT:  77 yo Female with PMH of HTN, TIA (2002) Hypothyroidism, Hypercholesterolemia, LBBB, Breast cancer, and current everyday smoker. PSH L CEA 06/2020, Left Mastectomy, Total Thyroidectomy, and Partial Hysterectomy and EF 45%, AS, MR on Pre-op echo s/p R CEA        PLAN:   Neurologic: Hx of TIA   - restarted home sertraline 50mcg  - s/p R CEA  - Q1 neurovascular checks   - Pain controlled with Tylenol    Respiratory:   - GETA, extubated post op, on NC, wean to RA as tolerated  - Encourage IS   - F/u CXR    Cardiovascular: Hx of HLD   ASA 81mg  - BP goal 110-160  - Trops x2 negative,f/u 3rd set  - Resume Rosuvastatin 40mg, Lisinopril 20mg    Gastrointestinal/Nutrition:   - Regular Diet  - GI PPX: PPI, senna     Renal/Genitourinary:   Monitor UO-voiding    BUN/Cr- 17/0.7  -->,  17/0.6  -->,  14/0.7  -->  Electrolytes-Na 143 // K 4.2 // Mg 2.5 //  Phos 4.2 03-10 @ 23:53  Na 138 // K 4.2 // Mg 1.7 //  Phos 3.6 03-10 @ 13:20    Hematologic:   DVT propylaxis-holding as per vascular, f/u 3/11 AM  ASA restarted  Hb/Hct:  11.8/35.4  -->,  11.0/34.6    Plts:  200  -->,  179  -->  T&S:ABO Rh Confirmation:  (03-10)    Infectious Disease:   WBC- 8.33  --->>,  10.04  --->>  Temp trend- 24hrs T(F): 98.7 (03-11 @ 04:00), Max: 98.7 (03-11 @ 04:00)    Lines/Tubes:  - R 18g IV  - R arterial A-line (remove)    Endocrine: Hx of hypothyroid, s/p thyroidectomy  - Resume Levothyroxine 25mcg        Disposition:Discharge        ASSESSMENT:  75 yo Female with PMH of HTN, TIA (2002) Hypothyroidism, Hypercholesterolemia, LBBB, Breast cancer, and current everyday smoker. PSH L CEA 06/2020, Left Mastectomy, Total Thyroidectomy, and Partial Hysterectomy and EF 45%, AS, MR on Pre-op echo s/p R CEA        PLAN:   Neurologic: Hx of TIA   - restarted home sertraline 50mcg  - s/p R CEA  - Q4 neurovascular checks   - Pain controlled with Tylenol    Respiratory:   - on NC 3L, wean to RA as tolerated  - Hx of smoking, maintain sat >88%  - Encourage IS   - F/u CXR    Cardiovascular: Hx of HLD   ASA 81mg  - BP goal 110-160  - Trops x3 negative  - Resume Rosuvastatin 40mg,   - HTN: restarted Lisinopril 20mg    Gastrointestinal/Nutrition:   - Regular Diet  - GI PPX: PPI, senna     Renal/Genitourinary:   Monitor UO-voiding    BUN/Cr-   14/0.7    Electrolytes-Na 143 // K 4.2 // Mg 2.5 //  Phos 4.2 03-10 @ 23:53      Hematologic:   DVT propylaxis-holding as per vascular, f/u 3/11 AM  ASA restarted  Hb/Hct:  11.8/35.4  -->,  11.0/34.6    Plts:  200  -->,  179        Infectious Disease:   WBC- 8.33  --->>,  10.04  --->>  afebrile  no abx    Lines/Tubes:  - R 18g IV  - R arterial A-line (remove)    Endocrine: Hx of hypothyroid, s/p thyroidectomy  - Resume Levothyroxine 25mcg        Disposition: Discharge

## 2021-03-11 NOTE — DISCHARGE NOTE PROVIDER - HOSPITAL COURSE
75 yo Female with PMH of HTN, TIA (2002) Hypothyroidism, Hypercholesterolemia, LBBB,  and Breast cancer, PSH L CEA 06/2020, Left Mastectomy (Left Limb Precautions), Thyroidectomy, and Partial Hysterectomy and EF 45% on Pre-op echo. Patient with asymptomatic significant R carotid stenosis.     Patient presented 3/10/21 for elective Right Carotid Endarterectomy.   Intra op: 5000U Heparin, no protamine, and 1g Ancef intraop. Hemodynamically stable during the case.     Pt did well overnight. Resumed regular diet. Ambulated and voided.  ASA 81 mg po resumed. All her home mediations resumed

## 2021-03-11 NOTE — DISCHARGE NOTE NURSING/CASE MANAGEMENT/SOCIAL WORK - PATIENT PORTAL LINK FT
You can access the FollowMyHealth Patient Portal offered by [facility] by registering at the following website: [email or web address]. By joining [employer]’s FollowMyHealth portal, you will also be able to view your health information using other applications (apps) compatible with our system.

## 2021-03-11 NOTE — PROGRESS NOTE ADULT - ATTENDING COMMENTS
Patient required Labetalol overnight for uncontrolled HTN  Remains neurologically intact.  Pain is controlled.  Tolerates diet.    ASSESSMENT:  75 y/o female with Right Carotid Artery Stenosis.  S/P Right CEA.  HTN  Acute postoperative pain.  Atelectasis.    PLAN:  - incentive spirometer  - OOB   - increase Lisinopril dose to control BP better.  - keep normotensive  - regular diet  - follow serum electrolytes and UOP.  - GI and DVT prophylaxis

## 2021-03-11 NOTE — DISCHARGE NOTE PROVIDER - CARE PROVIDER_API CALL
Tyler Dill)  Vascular Surgery  [address] [place] [address]  Phone: [phone]  Fax: [phone]  Follow Up Time: 2 weeks

## 2021-03-11 NOTE — DISCHARGE NOTE NURSING/CASE MANAGEMENT/SOCIAL WORK - NSDCPEWEB_GEN_ALL_CORE
[facility] for Tobacco Control website --- [email or web address] --- [email or web address]

## 2021-03-11 NOTE — PROGRESS NOTE ADULT - SUBJECTIVE AND OBJECTIVE BOX
JAZMIN ESPARZA  [id number]  76y Female    Indication for ICU admission: s/p R CEA    Admit Date:03-10-21  ICU Date: 03-10-21  OR Date:03-10-21    No Known Allergies    PAST MEDICAL & SURGICAL HISTORY:  Hypercholesterolemia  Stroke TIA-2002  Breast cancer -LEFT RADICALMASTECTECTOMY  Hypothyroidism  HTN (hypertension)  H/O mastectomy LEFT  H/O carotid endarterectomy LT- 2020  H/O total thyroidectomy cancer  H/O: hysterectomy PARTIAL      Home Medications:  aspirin 81 mg oral tablet: 1 tab(s) orally once a day (10 Mar 2021 06:31)  Crestor 40 mg oral tablet: 1 tab(s) orally once a day (10 Mar 2021 06:31)  folic acid 1 mg oral tablet: 1 tab(s) orally once a day (10 Mar 2021 06:31)  lisinopril 20 mg oral tablet: 1 tab(s) orally once a day (10 Mar 2021 06:31)  Synthroid 25 mcg (0.025 mg) oral tablet: 1 tab(s) orally once a day (10 Mar 2021 06:31)  Vitamin C:  (10 Mar 2021 06:31)  Vitamin D3:  (10 Mar 2021 06:31)  Zoloft 50 mg oral tablet: 1 tab(s) orally once a day (10 Mar 2021 06:31)        24HRS EVENT:  3/10  Night  Hypertensive- 1x dose of labetolol  -Troponin neg x2, f/u AM 3rd    Day  - Hypotensicve- given 250 bolus  - Holding Lisinopril for Hypotension  - Passed TOV  - Repleted Mg    \    Day  - Hypotensicve- given 250 bolus  - Holding Lisinopril for Hypotension  - Passed TOV  - Repleted Mg    ASSESSMENT:  75 yo Female with PMH of HTN, TIA (2002) Hypothyroidism, Hypercholesterolemia, LBBB, Breast cancer, and current everyday smoker. PSH L CEA 06/2020, Left Mastectomy, Total Thyroidectomy, and Partial Hysterectomy and EF 45%, AS, MR on Pre-op echo s/p R CEA      PLAN:   Neurologic: Hx of TIA   - restarted home sertraline 50mcg  - s/p R CEA  - Q1 neurovascular checks   - Pain controlled with Tylenol    Respiratory:   - GETA, extubated post op, on NC, wean to RA as tolerated  - Encourage IS   - CXR- Mild pulmonary vascular congestion    Cardiovascular: Hx of HLD  - Start ASA tomorrow 81mg, Holding as per primary team  - BP goal 110-160  - Trops x1 negative, F/U Cardiac enzymes x2  - Resume Rosuvastatin 40mg, Lisinopril 20mg    Gastrointestinal/Nutrition:   - Regular Diet  - GI PPX: PPI, senna     Renal/Genitourinary:   - No panchal, tov  - BUN/Cr - 17/0.6  - Monitor electrolytes  - hypomagnesemia repleted     Hematologic:   - DVT ppx: hold today per primary team, given 5000U Heparin in OR  - STAT ASA 81mg given in PACU, Start ASA 81mg Tomorrow  - F/U CBC, Coags    Infectious Disease:   - 1g Ancef given Intra-op  -monitor for leukocytosis, fever  -WBC --> 8.33    Lines/Tubes:  - R 18g IV  - R arterial A-line    Endocrine: Hx of hypothyroid, s/p thyroidectomy  - Resume Levothyroxine 25mcg        Disposition: SICU      JAZMIN ESPARZA  [id number]  76y Female    Indication for ICU admission: s/p R CEA    Admit Date:03-10-21  ICU Date: 03-10-21  OR Date:03-10-21    No Known Allergies    PAST MEDICAL & SURGICAL HISTORY:  Hypercholesterolemia  Stroke TIA-2002  Breast cancer -LEFT RADICALMASTECTECTOMY  Hypothyroidism  HTN (hypertension)  H/O mastectomy LEFT  H/O carotid endarterectomy LT- 2020  H/O total thyroidectomy cancer  H/O: hysterectomy PARTIAL      Home Medications:  aspirin 81 mg oral tablet: 1 tab(s) orally once a day (10 Mar 2021 06:31)  Crestor 40 mg oral tablet: 1 tab(s) orally once a day (10 Mar 2021 06:31)  folic acid 1 mg oral tablet: 1 tab(s) orally once a day (10 Mar 2021 06:31)  lisinopril 20 mg oral tablet: 1 tab(s) orally once a day (10 Mar 2021 06:31)  Synthroid 25 mcg (0.025 mg) oral tablet: 1 tab(s) orally once a day (10 Mar 2021 06:31)  Vitamin C:  (10 Mar 2021 06:31)  Vitamin D3:  (10 Mar 2021 06:31)  Zoloft 50 mg oral tablet: 1 tab(s) orally once a day (10 Mar 2021 06:31)        24HRS EVENT:  3/10  Night  Hypertensive- 1x dose of labetolol  -Troponin neg x2, f/u AM 3rd    Day  - Hypotensicve- given 250 bolus  - Holding Lisinopril for Hypotension  - Passed TOV  - Repleted Mg      Daily     Daily     Diet, DASH/TLC:   Sodium & Cholesterol Restricted (03-10-21 @ 14:54)      CURRENT MEDS:  Neurologic Medications  acetaminophen   Tablet .. 650 milliGRAM(s) Oral every 6 hours PRN Mild Pain (1 - 3)  sertraline 50 milliGRAM(s) Oral daily    Respiratory Medications    Cardiovascular Medications  lisinopril 20 milliGRAM(s) Oral daily    Gastrointestinal Medications  folic acid 1 milliGRAM(s) Oral daily  pantoprazole    Tablet 40 milliGRAM(s) Oral before breakfast  senna 2 Tablet(s) Oral at bedtime    Genitourinary Medications    Hematologic/Oncologic Medications  aspirin  chewable 81 milliGRAM(s) Oral daily    Antimicrobial/Immunologic Medications    Endocrine/Metabolic Medications  atorvastatin 80 milliGRAM(s) Oral at bedtime  levothyroxine 25 MICROGram(s) Oral daily    Topical/Other Medications      ICU Vital Signs Last 24 Hrs  T(C): 37.1 (11 Mar 2021 04:00), Max: 37.1 (11 Mar 2021 04:00)  T(F): 98.7 (11 Mar 2021 04:00), Max: 98.7 (11 Mar 2021 04:00)  HR: 65 (11 Mar 2021 07:00) (61 - 78)  BP: 145/79 (11 Mar 2021 06:00) (98/46 - 176/47)  BP(mean): 100 (11 Mar 2021 06:00) (67 - 102)  ABP: 150/63 (11 Mar 2021 07:00) (107/28 - 178/75)  ABP(mean): 90 (11 Mar 2021 07:00) (53 - 108)  RR: 17 (11 Mar 2021 07:00) (15 - 29)  SpO2: 97% (11 Mar 2021 07:00) (93% - 100%)            I&O's Summary    10 Mar 2021 07:01  -  11 Mar 2021 07:00  --------------------------------------------------------  IN: 560 mL / OUT: 1200 mL / NET: -640 mL      I&O's Detail    10 Mar 2021 07:01  -  11 Mar 2021 07:00  --------------------------------------------------------  IN:    IV PiggyBack: 350 mL    Lactated Ringers: 150 mL    Oral Fluid: 60 mL  Total IN: 560 mL    OUT:    Voided (mL): 1200 mL  Total OUT: 1200 mL    Total NET: -640 mL          PHYSICAL EXAM:    General/Neuro   Deficits:  alert & oriented x 3, no focal deficits, no tongue deviation, Wound dressing CDI    Lungs:      clear to auscultation, Normal expansion/effort.     Cardiovascular : S1, S2.  Regular rate and rhythm.  No Peripheral edema   Cardiac Rhythm: Normal Sinus Rhythm    GI: Abdomen soft, Non-tender, Non-distended.      Extremities: Extremities warm, pink, well-perfused. Pulses: DP palpable bilaterally    Derm: Good skin turgor, no skin breakdown.      :    No Crook       CXR:       LABS:  CAPILLARY BLOOD GLUCOSE                              11.0   10.04 )-----------( 179      ( 10 Mar 2021 23:53 )             34.6       03-10    143  |  108  |  14  ----------------------------<  88  4.2   |  25  |  0.7    Ca    8.3<L>      10 Mar 2021 23:53  Phos  4.2     03-10  Mg     2.5     03-10          CARDIAC MARKERS ( 11 Mar 2021 06:20 )  x     / <0.01 ng/mL / x     / x     / x      CARDIAC MARKERS ( 10 Mar 2021 13:20 )  x     / <0.01 ng/mL / 29 U/L / x     / 1.4 ng/mL  CARDIAC MARKERS ( 10 Mar 2021 12:05 )  x     / <0.01 ng/mL / 26 U/L / x     / 1.7 ng/mL

## 2021-03-11 NOTE — DISCHARGE NOTE PROVIDER - NSDCMRMEDTOKEN_GEN_ALL_CORE_FT
acetaminophen 325 mg oral tablet: 2 tab(s) orally every 6 hours, As needed, Mild Pain (1 - 3)  aspirin 81 mg oral tablet: 1 tab(s) orally once a day  Crestor 40 mg oral tablet: 1 tab(s) orally once a day  folic acid 1 mg oral tablet: 1 tab(s) orally once a day  lisinopril 20 mg oral tablet: 1 tab(s) orally once a day  Synthroid 25 mcg (0.025 mg) oral tablet: 1 tab(s) orally once a day  Vitamin C:   Vitamin D3:   Zoloft 50 mg oral tablet: 1 tab(s) orally once a day

## 2021-03-11 NOTE — PROGRESS NOTE ADULT - SUBJECTIVE AND OBJECTIVE BOX
GENERAL SURGERY PROGRESS NOTE     JAZMIN ESPARZA  76y  Female  Hospital day :1d  POD:  Procedure: Right carotid endarterectomy      OVERNIGHT EVENTS: 1x hypertensive episode requiring 1x labetalol dose, normotensive since    T(F): 98.2 (03-11-21 @ 08:00), Max: 98.7 (03-11-21 @ 04:00)  HR: 73 (03-11-21 @ 10:00) (61 - 78)  BP: 153/64 (03-11-21 @ 08:00) (98/46 - 176/47)  ABP: 158/36 (03-11-21 @ 10:00) (107/28 - 178/75)  ABP(mean): 77 (03-11-21 @ 10:00) (53 - 108)  RR: 16 (03-11-21 @ 10:00) (15 - 29)  SpO2: 97% (03-11-21 @ 10:00) (93% - 100%)    DIET/FLUIDS: folic acid 1 milliGRAM(s) Oral daily    NG:   URINE:      GI proph:  pantoprazole    Tablet 40 milliGRAM(s) Oral before breakfast    AC/ proph: aspirin  chewable 81 milliGRAM(s) Oral daily    ABx:     PHYSICAL EXAM:  GENERAL: NAD, well-appearing, neck wound cdi, no neurological deficit  CHEST/LUNG: Clear to auscultation bilaterally  HEART: Regular rate and rhythm  ABDOMEN: Soft, Nontender, Nondistended;   EXTREMITIES:  No clubbing, cyanosis, or edema      LABS  CAPILLARY BLOOD GLUCOSE                              11.0   10.04 )-----------( 179      ( 10 Mar 2021 23:53 )             34.6       Auto Neutrophil %: 76.6 % (03-10-21 @ 12:05)  Auto Immature Granulocyte %: 0.4 % (03-10-21 @ 12:05)    03-10    143  |  108  |  14  ----------------------------<  88  4.2   |  25  |  0.7      Calcium, Total Serum: 8.3 mg/dL (03-10-21 @ 23:53)      LFTs:         Coags:    CARDIAC MARKERS ( 11 Mar 2021 06:20 )  x     / <0.01 ng/mL / x     / x     / x      CARDIAC MARKERS ( 10 Mar 2021 13:20 )  x     / <0.01 ng/mL / 29 U/L / x     / 1.4 ng/mL  CARDIAC MARKERS ( 10 Mar 2021 12:05 )  x     / <0.01 ng/mL / 26 U/L / x     / 1.7 ng/mL

## 2021-03-11 NOTE — PROGRESS NOTE ADULT - ASSESSMENT
Assessment:  76y Female s/p right CEA. Uncomplicated post op course, had 1 episode of hypertension to 170's received 1x 10mg labetalol. Currently normotensive.     Plan:  - KIM LACKEY  - discharge home today  - restart home meds  - ASA/statin

## 2021-03-11 NOTE — DISCHARGE NOTE NURSING/CASE MANAGEMENT/SOCIAL WORK - NSDCPEEMAIL_GEN_ALL_CORE
[facility] email [email or web address]

## 2021-03-26 ENCOUNTER — APPOINTMENT (OUTPATIENT)
Dept: VASCULAR SURGERY | Facility: CLINIC | Age: 76
End: 2021-03-26
Payer: MEDICARE

## 2021-03-26 VITALS — DIASTOLIC BLOOD PRESSURE: 67 MMHG | SYSTOLIC BLOOD PRESSURE: 175 MMHG

## 2021-03-26 VITALS — SYSTOLIC BLOOD PRESSURE: 195 MMHG | DIASTOLIC BLOOD PRESSURE: 64 MMHG | TEMPERATURE: 97 F

## 2021-03-26 PROCEDURE — 99024 POSTOP FOLLOW-UP VISIT: CPT
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
## 2021-04-27 ENCOUNTER — APPOINTMENT (OUTPATIENT)
Dept: VASCULAR SURGERY | Facility: CLINIC | Age: 76
End: 2021-04-27
Payer: MEDICARE

## 2021-04-27 VITALS — DIASTOLIC BLOOD PRESSURE: 61 MMHG | SYSTOLIC BLOOD PRESSURE: 162 MMHG | HEART RATE: 80 BPM

## 2021-04-27 DIAGNOSIS — I65.23 OCCLUSION AND STENOSIS OF BILATERAL CAROTID ARTERIES: ICD-10-CM

## 2021-04-27 PROCEDURE — 93880 EXTRACRANIAL BILAT STUDY: CPT

## 2021-04-27 PROCEDURE — 99024 POSTOP FOLLOW-UP VISIT: CPT

## 2021-04-27 NOTE — DATA REVIEWED
[FreeTextEntry1] : carotid duplex right CEA patent Left CEA patent no evidence of recurrent stenosis

## 2021-04-27 NOTE — REASON FOR VISIT
[Follow-Up: _____] : a [unfilled] follow-up visit [Other: _____] : [unfilled] [FreeTextEntry1] : S/P right CEA and is a former patient of Dr. Dill

## 2021-04-27 NOTE — ASSESSMENT
[FreeTextEntry1] : Patient is 76-year-old female former patient of Dr. day 2 status post right carotid endarterectomy 6 weeks ago. The patient also had a left carotid endarterectomy in June of 2020. Patient tolerated the procedure well. She offers no new complaints. I recommend she remained on antiplatelet regimen daily as well as lipid-lowering agent. Posterior back my office in months time or sooner if any symptoms develop.

## 2021-04-27 NOTE — HISTORY OF PRESENT ILLNESS
[FreeTextEntry1] : The patient is 6 weeks s/p right CEA and left CEA 6/2021. The patient denies any new symptoms

## 2021-07-21 ENCOUNTER — APPOINTMENT (OUTPATIENT)
Dept: CARDIOLOGY | Facility: CLINIC | Age: 76
End: 2021-07-21
Payer: MEDICARE

## 2021-07-21 ENCOUNTER — RESULT CHARGE (OUTPATIENT)
Age: 76
End: 2021-07-21

## 2021-07-21 VITALS
HEART RATE: 86 BPM | TEMPERATURE: 98 F | BODY MASS INDEX: 25.2 KG/M2 | SYSTOLIC BLOOD PRESSURE: 150 MMHG | DIASTOLIC BLOOD PRESSURE: 60 MMHG | WEIGHT: 125 LBS | HEIGHT: 59 IN

## 2021-07-21 DIAGNOSIS — Z98.890 OTHER SPECIFIED POSTPROCEDURAL STATES: ICD-10-CM

## 2021-07-21 DIAGNOSIS — I10 ESSENTIAL (PRIMARY) HYPERTENSION: ICD-10-CM

## 2021-07-21 DIAGNOSIS — I35.0 NONRHEUMATIC AORTIC (VALVE) STENOSIS: ICD-10-CM

## 2021-07-21 PROCEDURE — 93000 ELECTROCARDIOGRAM COMPLETE: CPT

## 2021-07-21 PROCEDURE — 99213 OFFICE O/P EST LOW 20 MIN: CPT

## 2021-07-21 PROCEDURE — 99072 ADDL SUPL MATRL&STAF TM PHE: CPT

## 2021-07-21 NOTE — ASSESSMENT
[FreeTextEntry1] : 2+-3+ AI Class I\par Normal coronaries\par LBBB\par (-) thall  and 2016\par under stress brother  and lost house lukasz other brother mouth cancer\par HTN\par Hypercholesterolemia\par CVA \par MoD MR and AI Muga scan Ef 62%\par ClassI\par s/p L CEA Deitsch still needs right\par she has lung nodule which is being followed \par thall(-) and echo 2-3+ AI normal EF and LV dimensions\par lung nodule decreasing in size seen [facility] [place]\par echo 2018 hert size bigger EF still good on MUGA\par AAA (-)\par f/u echo  no change 2+ MR\par LDL 72 no change needs to take crestor daily\par echo 2020  EF 45% due to LBBB and 2+ AI P1/2 300-400 and Mod MR No change \par Bp up but not taking med every day discussed with the patient \par s/p right carotid and did well \par she went back to work \par f/u echo January

## 2021-10-26 ENCOUNTER — APPOINTMENT (OUTPATIENT)
Dept: VASCULAR SURGERY | Facility: CLINIC | Age: 76
End: 2021-10-26

## 2021-12-13 ENCOUNTER — APPOINTMENT (OUTPATIENT)
Dept: CARDIOLOGY | Facility: CLINIC | Age: 76
End: 2021-12-13
Payer: MEDICARE

## 2021-12-13 PROCEDURE — 93306 TTE W/DOPPLER COMPLETE: CPT

## 2022-01-13 ENCOUNTER — RESULT REVIEW (OUTPATIENT)
Age: 77
End: 2022-01-13

## 2022-01-13 ENCOUNTER — OUTPATIENT (OUTPATIENT)
Dept: OUTPATIENT SERVICES | Facility: HOSPITAL | Age: 77
LOS: 1 days | Discharge: HOME | End: 2022-01-13
Payer: MEDICARE

## 2022-01-13 DIAGNOSIS — I44.7 LEFT BUNDLE-BRANCH BLOCK, UNSPECIFIED: ICD-10-CM

## 2022-01-13 DIAGNOSIS — E89.0 POSTPROCEDURAL HYPOTHYROIDISM: Chronic | ICD-10-CM

## 2022-01-13 DIAGNOSIS — Z98.890 OTHER SPECIFIED POSTPROCEDURAL STATES: Chronic | ICD-10-CM

## 2022-01-13 DIAGNOSIS — Z90.10 ACQUIRED ABSENCE OF UNSPECIFIED BREAST AND NIPPLE: Chronic | ICD-10-CM

## 2022-01-13 DIAGNOSIS — Z90.710 ACQUIRED ABSENCE OF BOTH CERVIX AND UTERUS: Chronic | ICD-10-CM

## 2022-01-13 PROCEDURE — 78472 GATED HEART PLANAR SINGLE: CPT | Mod: 26

## 2022-01-31 ENCOUNTER — APPOINTMENT (OUTPATIENT)
Dept: CARDIOLOGY | Facility: CLINIC | Age: 77
End: 2022-01-31
Payer: COMMERCIAL

## 2022-01-31 VITALS
SYSTOLIC BLOOD PRESSURE: 122 MMHG | TEMPERATURE: 98 F | DIASTOLIC BLOOD PRESSURE: 70 MMHG | BODY MASS INDEX: 25.2 KG/M2 | HEIGHT: 59 IN | WEIGHT: 125 LBS

## 2022-01-31 DIAGNOSIS — I34.0 NONRHEUMATIC MITRAL (VALVE) INSUFFICIENCY: ICD-10-CM

## 2022-01-31 DIAGNOSIS — E78.2 MIXED HYPERLIPIDEMIA: ICD-10-CM

## 2022-01-31 DIAGNOSIS — I44.7 LEFT BUNDLE-BRANCH BLOCK, UNSPECIFIED: ICD-10-CM

## 2022-01-31 DIAGNOSIS — Z72.0 TOBACCO USE: ICD-10-CM

## 2022-01-31 DIAGNOSIS — I35.1 NONRHEUMATIC AORTIC (VALVE) INSUFFICIENCY: ICD-10-CM

## 2022-01-31 PROCEDURE — 99214 OFFICE O/P EST MOD 30 MIN: CPT

## 2022-01-31 PROCEDURE — 93000 ELECTROCARDIOGRAM COMPLETE: CPT

## 2022-01-31 RX ORDER — LEVOTHYROXINE SODIUM 0.17 MG/1
TABLET ORAL
Refills: 0 | Status: DISCONTINUED | COMMUNITY
End: 2022-01-31

## 2022-01-31 RX ORDER — LEVOTHYROXINE SODIUM 50 UG/1
50 TABLET ORAL DAILY
Refills: 0 | Status: ACTIVE | COMMUNITY

## 2022-01-31 NOTE — REVIEW OF SYSTEMS
[Fever] : no fever [Chills] : no chills [Blurry Vision] : no blurred vision [SOB] : no shortness of breath [Dyspnea on exertion] : not dyspnea during exertion [Chest Discomfort] : no chest discomfort [Lower Ext Edema] : no extremity edema [Leg Claudication] : no intermittent leg claudication [Palpitations] : no palpitations [Orthopnea] : no orthopnea [PND] : no PND [Syncope] : no syncope [Cough] : no cough [Abdominal Pain] : no abdominal pain [Joint Pain] : no joint pain [Dizziness] : no dizziness

## 2022-01-31 NOTE — REASON FOR VISIT
[Initial Evaluation] : an initial evaluation of [FreeTextEntry1] : 76F with mod AI, mod MR, b/l CEA with Dr. Dill, CVA 2001, DLD, HTN, hypothyroidism, breast cancer here for follow-up of AI/MR. \par \par Feels well, no complaints. She has no chest pain, SOB, lightheadedness/dizziness. Goes for walk with her dog daily. \par \par Still smoking 3 cigs/day (after each meal). \par \par Recent lab work performed, PCP increased statin for \par \par Continues to work as an  for [employer]\par  - works at a car dealership\par Dog - Buttons\par \par 10/14/2021\par Hgb A1c 5.7%\par , , HDL 70, \par Cr 0.85\par AST/ALT 16, 7\par hgb 14.6\par

## 2022-01-31 NOTE — ASSESSMENT
[FreeTextEntry1] : Assessment:\par #Mod AI\par #Mod MR\par - well compensated on exam, no symptoms\par - 12/21 echo with mod AI and mod MR, wall motion abnl likely due to LBBB \par - Muga 1/22 with LVEF 56%\par #DLD\par - 10/21 , , HDL 70,  --> rosuva increased to 40 for this labs\par #Hxo b/l CEA Dr. Dill \par #CVA\par #HTN\par #Hxo Breast CA s/p double mastectomy\par #Tobacco use\par \par Plan:\par - Continue lisinopril 20 mg daily and rosuvastatin 40 mg daily\par - Cont ASA 81 mg daily\par - Lpid panel at next visit \par - RTC 6 months\par

## 2022-01-31 NOTE — PHYSICAL EXAM
[General Appearance - Well Developed] : well developed [Normal Appearance] : normal appearance [Well Groomed] : well groomed [General Appearance - Well Nourished] : well nourished [General Appearance - In No Acute Distress] : no acute distress [Heart Rate And Rhythm] : heart rate and rhythm were normal [Heart Sounds] : normal S1 and S2 [Arterial Pulses Normal] : the arterial pulses were normal [Systolic grade ___/6] : A grade [unfilled]/6 systolic murmur was heard. [Diastolic Grade ___/4] : A grade [unfilled]/4 diastolic murmur was heard. [Right Carotid Bruit] : no bruit heard over the right carotid [Left Carotid Bruit] : no bruit heard over the left carotid [] : no respiratory distress [Auscultation Breath Sounds / Voice Sounds] : lungs were clear to auscultation bilaterally [Bowel Sounds] : normal bowel sounds [Abdomen Soft] : soft [Abnormal Walk] : normal gait [No Venous Stasis] : no venous stasis [FreeTextEntry1] : telangiectasias on feet [Oriented To Time, Place, And Person] : oriented to person, place, and time

## 2022-07-14 ENCOUNTER — APPOINTMENT (OUTPATIENT)
Dept: CARDIOLOGY | Facility: CLINIC | Age: 77
End: 2022-07-14

## 2023-08-08 NOTE — DATA REVIEWED
[FreeTextEntry1] : I performed a carotid duplex which was medically necessary to evaluate for high grade carotid stenosis. It showed 50-69% bilateral ICA stenosis with PSV > 200 cm/s bilaterally.\par  Dapsone Counseling: I discussed with the patient the risks of dapsone including but not limited to hemolytic anemia, agranulocytosis, rashes, methemoglobinemia, kidney failure, peripheral neuropathy, headaches, GI upset, and liver toxicity.  Patients who start dapsone require monitoring including baseline LFTs and weekly CBCs for the first month, then every month thereafter.  The patient verbalized understanding of the proper use and possible adverse effects of dapsone.  All of the patient's questions and concerns were addressed.

## 2025-01-09 ENCOUNTER — APPOINTMENT (OUTPATIENT)
Facility: CLINIC | Age: 80
End: 2025-01-09